# Patient Record
Sex: FEMALE | Race: OTHER | HISPANIC OR LATINO | Employment: FULL TIME | URBAN - METROPOLITAN AREA
[De-identification: names, ages, dates, MRNs, and addresses within clinical notes are randomized per-mention and may not be internally consistent; named-entity substitution may affect disease eponyms.]

---

## 2017-12-20 ENCOUNTER — HOSPITAL ENCOUNTER (EMERGENCY)
Facility: HOSPITAL | Age: 57
Discharge: HOME | End: 2017-12-20
Attending: EMERGENCY MEDICINE | Admitting: EMERGENCY MEDICINE

## 2017-12-20 ENCOUNTER — APPOINTMENT (EMERGENCY)
Dept: CT IMAGING | Facility: HOSPITAL | Age: 57
End: 2017-12-20

## 2017-12-20 VITALS
DIASTOLIC BLOOD PRESSURE: 72 MMHG | SYSTOLIC BLOOD PRESSURE: 119 MMHG | OXYGEN SATURATION: 98 % | WEIGHT: 204.59 LBS | RESPIRATION RATE: 18 BRPM | TEMPERATURE: 97.4 F | HEART RATE: 79 BPM

## 2017-12-20 DIAGNOSIS — S39.012A STRAIN OF LUMBAR REGION, INITIAL ENCOUNTER: Primary | ICD-10-CM

## 2017-12-20 DIAGNOSIS — G43.909 MIGRAINE HEADACHE: ICD-10-CM

## 2017-12-20 LAB
ANION GAP SERPL CALCULATED.3IONS-SCNC: 5 MMOL/L (ref 4–13)
BACTERIA UR QL AUTO: ABNORMAL /HPF
BASOPHILS # BLD AUTO: 0.02 THOUSANDS/ΜL (ref 0–0.1)
BASOPHILS NFR BLD AUTO: 0 % (ref 0–1)
BILIRUB UR QL STRIP: NEGATIVE
BUN SERPL-MCNC: 16 MG/DL (ref 5–25)
CALCIUM SERPL-MCNC: 9.1 MG/DL (ref 8.3–10.1)
CHLORIDE SERPL-SCNC: 107 MMOL/L (ref 100–108)
CLARITY UR: CLEAR
CO2 SERPL-SCNC: 33 MMOL/L (ref 21–32)
COLOR UR: YELLOW
CREAT SERPL-MCNC: 0.8 MG/DL (ref 0.6–1.3)
EOSINOPHIL # BLD AUTO: 0.1 THOUSAND/ΜL (ref 0–0.61)
EOSINOPHIL NFR BLD AUTO: 2 % (ref 0–6)
ERYTHROCYTE [DISTWIDTH] IN BLOOD BY AUTOMATED COUNT: 13.2 % (ref 11.6–15.1)
GFR SERPL CREATININE-BSD FRML MDRD: 82 ML/MIN/1.73SQ M
GLUCOSE SERPL-MCNC: 97 MG/DL (ref 65–140)
GLUCOSE UR STRIP-MCNC: NEGATIVE MG/DL
HCT VFR BLD AUTO: 40.3 % (ref 34.8–46.1)
HGB BLD-MCNC: 12.7 G/DL (ref 11.5–15.4)
HGB UR QL STRIP.AUTO: ABNORMAL
KETONES UR STRIP-MCNC: NEGATIVE MG/DL
LEUKOCYTE ESTERASE UR QL STRIP: ABNORMAL
LYMPHOCYTES # BLD AUTO: 1.48 THOUSANDS/ΜL (ref 0.6–4.47)
LYMPHOCYTES NFR BLD AUTO: 31 % (ref 14–44)
MCH RBC QN AUTO: 30.2 PG (ref 26.8–34.3)
MCHC RBC AUTO-ENTMCNC: 31.5 G/DL (ref 31.4–37.4)
MCV RBC AUTO: 96 FL (ref 82–98)
MONOCYTES # BLD AUTO: 0.31 THOUSAND/ΜL (ref 0.17–1.22)
MONOCYTES NFR BLD AUTO: 7 % (ref 4–12)
MUCOUS THREADS UR QL AUTO: ABNORMAL
NEUTROPHILS # BLD AUTO: 2.8 THOUSANDS/ΜL (ref 1.85–7.62)
NEUTS SEG NFR BLD AUTO: 60 % (ref 43–75)
NITRITE UR QL STRIP: NEGATIVE
NON-SQ EPI CELLS URNS QL MICRO: ABNORMAL /HPF
NRBC BLD AUTO-RTO: 0 /100 WBCS
PH UR STRIP.AUTO: 7 [PH] (ref 4.5–8)
PLATELET # BLD AUTO: 234 THOUSANDS/UL (ref 149–390)
PMV BLD AUTO: 10.3 FL (ref 8.9–12.7)
POTASSIUM SERPL-SCNC: 4.2 MMOL/L (ref 3.5–5.3)
PROT UR STRIP-MCNC: NEGATIVE MG/DL
RBC # BLD AUTO: 4.21 MILLION/UL (ref 3.81–5.12)
RBC #/AREA URNS AUTO: ABNORMAL /HPF
SODIUM SERPL-SCNC: 145 MMOL/L (ref 136–145)
SP GR UR STRIP.AUTO: 1.02 (ref 1–1.03)
UROBILINOGEN UR QL STRIP.AUTO: 0.2 E.U./DL
WBC # BLD AUTO: 4.71 THOUSAND/UL (ref 4.31–10.16)
WBC #/AREA URNS AUTO: ABNORMAL /HPF

## 2017-12-20 PROCEDURE — 85025 COMPLETE CBC W/AUTO DIFF WBC: CPT | Performed by: EMERGENCY MEDICINE

## 2017-12-20 PROCEDURE — 81001 URINALYSIS AUTO W/SCOPE: CPT

## 2017-12-20 PROCEDURE — 36415 COLL VENOUS BLD VENIPUNCTURE: CPT | Performed by: EMERGENCY MEDICINE

## 2017-12-20 PROCEDURE — 81002 URINALYSIS NONAUTO W/O SCOPE: CPT | Performed by: EMERGENCY MEDICINE

## 2017-12-20 PROCEDURE — 96365 THER/PROPH/DIAG IV INF INIT: CPT

## 2017-12-20 PROCEDURE — 96375 TX/PRO/DX INJ NEW DRUG ADDON: CPT

## 2017-12-20 PROCEDURE — 87086 URINE CULTURE/COLONY COUNT: CPT

## 2017-12-20 PROCEDURE — 80048 BASIC METABOLIC PNL TOTAL CA: CPT | Performed by: EMERGENCY MEDICINE

## 2017-12-20 PROCEDURE — 72131 CT LUMBAR SPINE W/O DYE: CPT

## 2017-12-20 PROCEDURE — 99284 EMERGENCY DEPT VISIT MOD MDM: CPT

## 2017-12-20 RX ORDER — MAGNESIUM SULFATE HEPTAHYDRATE 40 MG/ML
2 INJECTION, SOLUTION INTRAVENOUS ONCE
Status: COMPLETED | OUTPATIENT
Start: 2017-12-20 | End: 2017-12-20

## 2017-12-20 RX ORDER — DIPHENHYDRAMINE HYDROCHLORIDE 50 MG/ML
25 INJECTION INTRAMUSCULAR; INTRAVENOUS ONCE
Status: COMPLETED | OUTPATIENT
Start: 2017-12-20 | End: 2017-12-20

## 2017-12-20 RX ORDER — KETOROLAC TROMETHAMINE 30 MG/ML
30 INJECTION, SOLUTION INTRAMUSCULAR; INTRAVENOUS ONCE
Status: COMPLETED | OUTPATIENT
Start: 2017-12-20 | End: 2017-12-20

## 2017-12-20 RX ORDER — METOCLOPRAMIDE HYDROCHLORIDE 5 MG/ML
10 INJECTION INTRAMUSCULAR; INTRAVENOUS ONCE
Status: COMPLETED | OUTPATIENT
Start: 2017-12-20 | End: 2017-12-20

## 2017-12-20 RX ADMIN — KETOROLAC TROMETHAMINE 30 MG: 30 INJECTION, SOLUTION INTRAMUSCULAR at 14:27

## 2017-12-20 RX ADMIN — DIPHENHYDRAMINE HYDROCHLORIDE 25 MG: 50 INJECTION, SOLUTION INTRAMUSCULAR; INTRAVENOUS at 14:23

## 2017-12-20 RX ADMIN — SODIUM CHLORIDE 1000 ML: 0.9 INJECTION, SOLUTION INTRAVENOUS at 14:12

## 2017-12-20 RX ADMIN — METOCLOPRAMIDE 10 MG: 5 INJECTION, SOLUTION INTRAMUSCULAR; INTRAVENOUS at 14:24

## 2017-12-20 RX ADMIN — MAGNESIUM SULFATE HEPTAHYDRATE 2 G: 40 INJECTION, SOLUTION INTRAVENOUS at 14:30
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
## 2017-12-20 NOTE — DISCHARGE INSTRUCTIONS
Distensión lumbar   LO QUE NECESITA SABER:   ¿Qué es la distensión lumbar? La distensión lumbar es edil lesión en los músculos o tendones de la parte inferior de la espalda  Los tendones son los tejidos awa que [place] a los músculos con los [place]  La parte inferior de la espalda sostiene la mayor parte de monterroso peso corporal y facilita monterroso habilidad de moverse, torcerse y doblarse  ¿Qué ocasiona la distensión lumbar? La distensión lumbar por lo general es provocada por actividades que aumentan la tensión en la parte inferior de la espalda eddi el ejercicio o edil lesión  Los siguientes podrían aumentar monterroso riesgo para edil distensión lumbar:  · Usted sufrió anteriormente edil distensión lumbar  · Usted levanta objetos pesados con monterroso espalda en vez de usar afia piernas  · Usted no hace calentamiento antes de hacer ejercicio  · Usted pasa mucho tiempo parado o sentado  · Usted tiene sobrepeso  ¿Cuáles son los signos y síntomas de la distensión lumbar? · Dolor en la parte inferior de la espalda o espasmos musculares           · Rigidez o movimiento limitado    · Dolor que se desplaza hacia los glúteos, sallie o las piernas    · Dolor que empeora con la actividad  ¿Cómo se diagnostica la distensión lumbar? Podría realizarse edil radiografía, edil tomografía computarizada (TC) o edil imagen por resonancia magnética (IRM) para revisar si hay daño en monterroso columna, músculos o tendones  Es posible que le administren líquido de contraste para que la parte inferior de monterroso espalda se chi mejor en las imágenes  Dígale al médico si usted alguna vez ha tenido edil reacción alérgica al líquido de [place]  No entre a la aiyana donde se realiza la resonancia magnética con algo de metal  El metal puede causar lesiones serias  Dígale al médico si usted tiene algo de metal por dentro o sobre monterroso cuerpo  ¿Cómo se trata la distensión lumbar? · El acetaminofén [place] el dolor y baja la fiebre   Está disponible sin receta médica  Pregunte la cantidad y la frecuencia con que debe tomarlos  [address]  El acetaminofén puede causar daño en el hígado cuando no se john de forma correcta  · AINEs (Analgésicos antiinflamatorios no esteroides) eddi el ibuprofeno, ayudan a disminuir la inflamación, el dolor y la [place]  [place] medicamento esta disponible con o sin edil receta médica  Los AINEs pueden causar sangrado estomacal o problemas renales en ciertas personas  Si usted john un medicamento anticoagulante, siempre pregúntele a monterroso médico si los YONATHAN son seguros para usted  Siempre tawnya la etiqueta de meaghan medicamento y [place] instrucciones  · Relajantes musculares  ayudan a reducir dolor y espasmos musculares  · Un medicamento con receta para el dolor  podrían ser Larisa Justa  Pregunte cómo romel estos medicamentos de edil forma gardiner  · Cirugía  Podría necesitarse edil cirugíasi monterroso distensión es severa  ¿Cómo puedo controlar los síntomas? · Descanse  según las indicaciones  Es probable que necesite descansar en cama sun un tiempo después de lesionarse  No levante objetos pesados  · Aplique hielo  en la espalda de 15 a 20 minutos cada hora o eddi se le indique  Use un paquete de hielo o ponga hielo molido dentro de [employer]  Cúbrala con edil toalla  El hielo ayuda a evitar daño al tejido y a disminuir la inflamación y el dolor  · La aplicación de calor  en la parte inferior de la espalda de 20 a 30 minutos cada 2 horas sun los días que le indiquen  El calor ayuda a disminuir el dolor y los espasmos musculares  · Comience lentamente a aumentar monterroso nivel de actividad  conforme disminuya el dolor o eddi se lo indiquen  ¿Cómo se puede evitar la distensión lumbar? · Use movimientos corporales correctos  ¨ Flexione la cadera y las rodillas cuando Sudhir Wilson a levantar un objeto  No doble la cintura  Utilice los [employer] de las piernas mientras levanta monterroso carga  No use monterroso espalda   Nayeli Fong objeto cerca de monterroso pecho mientras lo levanta  No se tuerza, ni levante cualquier cosa por encima de monterroso cintura  ¨ Cambie monterroso posición frecuentemente cuando pase mucho tiempo de pie  Descanse un pie sobre edil Virgle Mule o un reposapiés e intercambie con el otro pie frecuentemente  ¨ No permanezca sentado por lapsos de tiempo prolongados  Cuando sea necesario hacerlo, siéntese en edil silla de respaldo recto con los pies apoyados en el suelo  ¨ Nunca alcance, jale ni empuje mientras se encuentra sentando  · Entre en calor antes de hacer ejercicio  Rita ejercicios que fortalezcan pamela músculos de la espalda  Pregunte a monterroso médico acerca del mejor plan de ejercicio para usted  · Mantenga un peso saludable  Consulte con monterroos médico cuánto debería pesar  Pida que le ayude a crear un plan para bajar de peso si usted tiene sobrepeso  ¿Cuándo michael buscar atención inmediata? · Usted oye o siente un estallido en la parte inferior de la espalda  · Usted tiene mayor inflamación o dolor en la parte inferior de monterroso espalda  · Usted tiene dificultad para  las piernas  · Pamela piernas están entumecidas  ¿Cuándo michael comunicarme con mi médico?   · Usted tiene fiebre  · El dolor no desaparece, incluso después del [place]  · Usted tiene preguntas o inquietudes acerca de monterroso condición o cuidado  ACUERDOS SOBRE MONTERROSO CUIDADO:   Usted tiene el derecho de ayudar a planear monterroso cuidado  Aprenda todo lo que pueda sobre monterroso condición y eddi darle tratamiento  Discuta pamela opciones de tratamiento con pamela médicos para decidir el cuidado que usted desea recibir  Usted siempre tiene el derecho de rechazar el tratamiento  Esta información es sólo para uso en educación  Monterroso intención no es darle un consejo médico sobre enfermedades o tratamientos  Colsulte con monterroso [employer] farmacéutico antes de seguir cualquier régimen médico para saber si es seguro y efectivo para usted    © 2017 26[address] Zack [address]quez Information is for End User's use only and may not be sold, redistributed or otherwise used for commercial purposes  All illustrations and images included in CareNotes® are the copyrighted property of A CU[employer] A M , Inc  or Francois Hensley  Migraña   LO QUE NECESITA SABER:   [place] es un dolor de aubrey intenso  El dolor puede ser tan severo que interfiere con pamela actividades cotidianas  [place] puede durar desde pocas horas hasta varios días  La causa exacta de la migraña no es conocida  INSTRUCCIONES SOBRE EL ANTELMO HOSPITALARIA:   Busque atención médica de inmediato si:   · Usted tiene dolor de aubrey que parece ser diferente o mucho peor que monterroso migraña habitual     · Usted tiene un dolor de aubrey severo con fiebre o rigidez en el aminata  · Usted tiene nuevos problemas con el habla, la visión, el equilibrio o el [address]  · Usted siente que se va a desmayar, se siente confundido o sufre edil convulsión  Comuníquese con monterroso médico o neurólogo si:   · Monterroso migraña interfiere con pamela actividades cotidianas  · Pamela medicamentos o tratamientos loy de funcionar  · Usted tiene preguntas o inquietudes acerca de monterroso condición o cuidado  Medicamentos:  [place] medicamento tan pronto eddi sienta que le comienza [place]  · Un medicamento con receta para el dolor  podrían ser Chanel Sample  No espere a que el dolor sea muy intenso para romel el medicamento  · Medicamentos para la migraña  se [place] para evitar edil migraña o detenerla edil vez que comience  · Los medicamentos contra las náuseas  pueden darse para calmar monterroso estómago y ayudarle a prevenir los vómitos  [place] medicamento también puede aliviar el dolor  · [place] pamela medicamentos eddi se le haya indicado  Llame a monterroso médico si usted piensa que el medicamento no está ayudando o si tiene efectos secundarios  Infórmele si es alérgico a cualquier medicamento   Mantenga edil lista actualizada de los [place], las vitaminas y METHLICK productos herbales que john  Incluya los siguientes datos de los medicamentos: cantidad, frecuencia y motivo de administración  Traiga con usted la lista o los envases de la píldoras a afia citas de seguimiento  Lleve la lista de los medicamentos con usted en barney de edil emergencia  [place] de monterroso síntomas:   · Repose en edil habitación oscura y [place]  [place] ayudará a disminuir el dolor  El dormir también podría ayudarlo a aliviar monterroso dolor  · Aplique hielo para reducir el dolor  Use un paquete de hielo o ponga hielo molido dentro de [employer]  Cubra el paquete con hielo con edil toalla y colóquela en monterroso aubrey donde siente el dolor por 15 a 20 minutos cada hora  · Aplique calor para disminuir el dolor y los espasmos musculares  Utilice edil toalla pequeña empapada con [place], edil almohada térmica o tome un baño de marvin con agua tibia  Aplique la compresa caliente sobre el área por 20 a 30 minutos cada 2 horas  Usted puede alternar el calor y el hielo  · Mantenga un registro de las migrañas  Escriba cuándo comienzan y terminan afia migrañas  Jane Closs y [place] haciendo cuando comenzó [place]  Registre lo que comió y lo que tomó las 24 horas antes de que comenzó monterroso migraña  Mantenga un registro de lo que hizo para tratar monterroso migraña y si funcionó  Programe edil kathi con monterroso médico o monterroso neurólogo eddi se le indique:  Lleve afia registros de migrañas con usted cada vez que visite a monterroso médico  Anote afia preguntas para que se acuerde de hacerlas sun afia visitas  Evite otra migraña:   · No fume  La nicotina y otros químicos en los cigarrillos y cigarros pueden desencadenar edil [place] y [place] provocar daño al pulmón  Pida información a monterroso médico si usted actualmente fuma y necesita ayuda para dejar de fumar  Los cigarrillos electrónicos o tabaco sin humo todavía contienen nicotina  Consulte con monterroso médico antes de [employer]  · No tome alcohol    El alcohol puede provocar migraña  También es posible que interfiera con los medicamentos utilizados para tratar jason migraña  · Ejercítese regularmente  El ejercicio puede ayudar a evitar migrañas  Consulte con jason médico acerca de cuál es el mejor régimen de ejercicio para usted  · Controle el estrés  El estrés podría provocar migraña  Aprenda nuevas maneras de relajarse eddi la respiración profunda  · Establezca un horario para dormir  Acuéstese y levántese a la misma hora cada día  · Coma afia comidas regularmente  Incluya alimentos [employer] fruta, verduras, panes de grano entero, productos lácteos bajos en grasa, frijoles, carne magra y pescado  No consuma alimentos o bebidas que puedan desencadenar afia migrañas  © 2016 [address] is for End User's use only and may not be sold, redistributed or otherwise used for commercial purposes  All illustrations and images included in CareNotes® are the copyrighted property of A D A M , Inc  or Francois Hensley  Esta información es sólo para uso en educación  Jason intención no es darle un consejo médico sobre enfermedades o tratamientos  Colsulte con jason Melven Rimes farmacéutico antes de seguir cualquier régimen médico para saber si es seguro y efectivo para usted

## 2017-12-20 NOTE — ED ATTENDING ATTESTATION
Vel Carpenter MD, saw and evaluated the patient  I have discussed the patient with the resident/non-physician practitioner and agree with the resident's/non-physician practitioner's findings, Plan of Care, and MDM as documented in the resident's/non-physician practitioner's note, except where noted  All available labs and Radiology studies were reviewed  At this point I agree with the current assessment done in the Emergency Department  I have conducted an independent evaluation of this patient a history and physical is as follows:  Patient is a 40-year-old female, history of asthma, comes in complaint of cough, body aches, lumbar back pain chest pain chronic, denies chest pain, dyspnea, fevers or phlegm  On exam no acute distress:  Vital signs stable, airway intact, lung exam shows very faint wheezes, no crackles or rhonchi; cardiovascular normal heart sounds, no peripheral edema, no calf tenderness or swelling, straight leg raise is negative  Impression:  Mild asthma exacerbation acute, acute on chronic back pain  CT Imaging of lumbar spine does not show any acute abnormality, chronic disc disease noted  Will treat as mild asthma exacerbation, acute on chronic back pain, discharged with PCP follow-up      Critical Care Time  CritCare Time    Procedures
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
## 2017-12-21 LAB — BACTERIA UR CULT: NORMAL

## 2018-03-06 ENCOUNTER — APPOINTMENT (EMERGENCY)
Dept: RADIOLOGY | Facility: HOSPITAL | Age: 58
End: 2018-03-06

## 2018-03-06 ENCOUNTER — HOSPITAL ENCOUNTER (EMERGENCY)
Facility: HOSPITAL | Age: 58
Discharge: HOME | End: 2018-03-06
Attending: EMERGENCY MEDICINE | Admitting: EMERGENCY MEDICINE

## 2018-03-06 VITALS
SYSTOLIC BLOOD PRESSURE: 142 MMHG | HEART RATE: 72 BPM | RESPIRATION RATE: 18 BRPM | WEIGHT: 202 LBS | DIASTOLIC BLOOD PRESSURE: 76 MMHG | OXYGEN SATURATION: 99 % | TEMPERATURE: 98 F

## 2018-03-06 DIAGNOSIS — J02.9 ACUTE VIRAL PHARYNGITIS: Primary | ICD-10-CM

## 2018-03-06 DIAGNOSIS — B34.9 ACUTE VIRAL SYNDROME: ICD-10-CM

## 2018-03-06 LAB
ALBUMIN SERPL BCP-MCNC: 3.6 G/DL (ref 3.5–5)
ALP SERPL-CCNC: 90 U/L (ref 46–116)
ALT SERPL W P-5'-P-CCNC: 21 U/L (ref 12–78)
ANION GAP SERPL CALCULATED.3IONS-SCNC: 7 MMOL/L (ref 4–13)
AST SERPL W P-5'-P-CCNC: 18 U/L (ref 5–45)
BACTERIA UR QL AUTO: ABNORMAL /HPF
BASOPHILS # BLD AUTO: 0.01 THOUSANDS/ΜL (ref 0–0.1)
BASOPHILS NFR BLD AUTO: 0 % (ref 0–1)
BILIRUB SERPL-MCNC: 0.41 MG/DL (ref 0.2–1)
BILIRUB UR QL STRIP: NEGATIVE
BUN SERPL-MCNC: 10 MG/DL (ref 5–25)
CALCIUM SERPL-MCNC: 8.6 MG/DL (ref 8.3–10.1)
CHLORIDE SERPL-SCNC: 109 MMOL/L (ref 100–108)
CLARITY UR: CLEAR
CO2 SERPL-SCNC: 30 MMOL/L (ref 21–32)
COLOR UR: YELLOW
CREAT SERPL-MCNC: 0.68 MG/DL (ref 0.6–1.3)
EOSINOPHIL # BLD AUTO: 0.13 THOUSAND/ΜL (ref 0–0.61)
EOSINOPHIL NFR BLD AUTO: 2 % (ref 0–6)
ERYTHROCYTE [DISTWIDTH] IN BLOOD BY AUTOMATED COUNT: 13.4 % (ref 11.6–15.1)
GFR SERPL CREATININE-BSD FRML MDRD: 97 ML/MIN/1.73SQ M
GLUCOSE SERPL-MCNC: 75 MG/DL (ref 65–140)
GLUCOSE UR STRIP-MCNC: NEGATIVE MG/DL
HCT VFR BLD AUTO: 38.6 % (ref 34.8–46.1)
HGB BLD-MCNC: 12.2 G/DL (ref 11.5–15.4)
HGB UR QL STRIP.AUTO: NEGATIVE
KETONES UR STRIP-MCNC: NEGATIVE MG/DL
LEUKOCYTE ESTERASE UR QL STRIP: ABNORMAL
LYMPHOCYTES # BLD AUTO: 1.49 THOUSANDS/ΜL (ref 0.6–4.47)
LYMPHOCYTES NFR BLD AUTO: 27 % (ref 14–44)
MCH RBC QN AUTO: 29.5 PG (ref 26.8–34.3)
MCHC RBC AUTO-ENTMCNC: 31.6 G/DL (ref 31.4–37.4)
MCV RBC AUTO: 93 FL (ref 82–98)
MONOCYTES # BLD AUTO: 0.34 THOUSAND/ΜL (ref 0.17–1.22)
MONOCYTES NFR BLD AUTO: 6 % (ref 4–12)
NEUTROPHILS # BLD AUTO: 3.63 THOUSANDS/ΜL (ref 1.85–7.62)
NEUTS SEG NFR BLD AUTO: 65 % (ref 43–75)
NITRITE UR QL STRIP: NEGATIVE
NON-SQ EPI CELLS URNS QL MICRO: ABNORMAL /HPF
NRBC BLD AUTO-RTO: 0 /100 WBCS
PH UR STRIP.AUTO: 7.5 [PH] (ref 4.5–8)
PLATELET # BLD AUTO: 251 THOUSANDS/UL (ref 149–390)
PMV BLD AUTO: 9.9 FL (ref 8.9–12.7)
POTASSIUM SERPL-SCNC: 4.2 MMOL/L (ref 3.5–5.3)
PROT SERPL-MCNC: 7.3 G/DL (ref 6.4–8.2)
PROT UR STRIP-MCNC: NEGATIVE MG/DL
RBC # BLD AUTO: 4.14 MILLION/UL (ref 3.81–5.12)
RBC #/AREA URNS AUTO: ABNORMAL /HPF
SODIUM SERPL-SCNC: 146 MMOL/L (ref 136–145)
SP GR UR STRIP.AUTO: 1.02 (ref 1–1.03)
TROPONIN I SERPL-MCNC: <0.02 NG/ML
UROBILINOGEN UR QL STRIP.AUTO: 0.2 E.U./DL
WBC # BLD AUTO: 5.6 THOUSAND/UL (ref 4.31–10.16)
WBC #/AREA URNS AUTO: ABNORMAL /HPF

## 2018-03-06 PROCEDURE — 36415 COLL VENOUS BLD VENIPUNCTURE: CPT | Performed by: EMERGENCY MEDICINE

## 2018-03-06 PROCEDURE — 93005 ELECTROCARDIOGRAM TRACING: CPT | Performed by: EMERGENCY MEDICINE

## 2018-03-06 PROCEDURE — 85025 COMPLETE CBC W/AUTO DIFF WBC: CPT | Performed by: EMERGENCY MEDICINE

## 2018-03-06 PROCEDURE — 84484 ASSAY OF TROPONIN QUANT: CPT | Performed by: EMERGENCY MEDICINE

## 2018-03-06 PROCEDURE — 99284 EMERGENCY DEPT VISIT MOD MDM: CPT

## 2018-03-06 PROCEDURE — 81002 URINALYSIS NONAUTO W/O SCOPE: CPT | Performed by: EMERGENCY MEDICINE

## 2018-03-06 PROCEDURE — 71046 X-RAY EXAM CHEST 2 VIEWS: CPT

## 2018-03-06 PROCEDURE — 81001 URINALYSIS AUTO W/SCOPE: CPT

## 2018-03-06 PROCEDURE — 94640 AIRWAY INHALATION TREATMENT: CPT

## 2018-03-06 PROCEDURE — 80053 COMPREHEN METABOLIC PANEL: CPT | Performed by: EMERGENCY MEDICINE

## 2018-03-06 RX ORDER — ALBUTEROL SULFATE 90 UG/1
2 AEROSOL, METERED RESPIRATORY (INHALATION) EVERY 6 HOURS PRN
COMMUNITY
End: 2018-09-21

## 2018-03-06 RX ORDER — ALBUTEROL SULFATE 0.63 MG/3ML
1 SOLUTION RESPIRATORY (INHALATION) EVERY 6 HOURS PRN
COMMUNITY

## 2018-03-06 RX ORDER — IPRATROPIUM BROMIDE AND ALBUTEROL SULFATE 2.5; .5 MG/3ML; MG/3ML
3 SOLUTION RESPIRATORY (INHALATION) ONCE
Status: COMPLETED | OUTPATIENT
Start: 2018-03-06 | End: 2018-03-06

## 2018-03-06 RX ADMIN — IPRATROPIUM BROMIDE AND ALBUTEROL SULFATE 3 ML: .5; 3 SOLUTION RESPIRATORY (INHALATION) at 10:27

## 2018-03-06 NOTE — ED PROVIDER NOTES
History  Chief Complaint   Patient presents with    Fatigue     pt complains of generalized fatigue that began yesterday, complains of nausea, sore, throat, and cough  denies fevers  71-year-old female presents for evaluation of mild sore throat, fatigue, chest pain  The patient describes a burning sensation in her throat without trouble swallowing  The patient has generalized associated fatigue  The patient also describes a substernal chest discomfort  She cannot quantify the duration or intensity of the chest pain  She has not done anything to make it better or any worse  The symptoms have been ongoing since yesterday  History provided by:  Patient      Prior to Admission Medications   Prescriptions Last Dose Informant Patient Reported? Taking? albuterol (ACCUNEB) 0 63 MG/3ML nebulizer solution   Yes Yes   Sig: Take 1 ampule by nebulization every 6 (six) hours as needed for wheezing   albuterol (PROVENTIL HFA,VENTOLIN HFA) 90 mcg/act inhaler   Yes Yes   Sig: Inhale 2 puffs every 6 (six) hours as needed for wheezing      Facility-Administered Medications: None       Past Medical History:   Diagnosis Date    Asthma     Back pain     Migraine        Past Surgical History:   Procedure Laterality Date     SECTION         History reviewed  No pertinent family history  I have reviewed and agree with the history as documented  Social History   Substance Use Topics    Smoking status: Never Smoker    Smokeless tobacco: Never Used    Alcohol use No        Review of Systems   Constitutional: Positive for fatigue  Negative for chills and fever  HENT: Positive for sore throat  Negative for trouble swallowing  Respiratory: Positive for chest tightness  Negative for cough and shortness of breath  Cardiovascular: Positive for chest pain  Gastrointestinal: Negative for abdominal distention, abdominal pain, diarrhea, nausea and vomiting  Neurological: Positive for dizziness  Negative for light-headedness  All other systems reviewed and are negative  Physical Exam  ED Triage Vitals [03/06/18 0947]   Temperature Pulse Respirations Blood Pressure SpO2   98 °F (36 7 °C) 72 18 142/76 99 %      Temp Source Heart Rate Source Patient Position - Orthostatic VS BP Location FiO2 (%)   Temporal Monitor -- Right arm --      Pain Score       4           Orthostatic Vital Signs  Vitals:    03/06/18 0947   BP: 142/76   Pulse: 72       Physical Exam   Constitutional: She is oriented to person, place, and time  She appears well-developed and well-nourished  No distress  HENT:   Head: Normocephalic and atraumatic  Right Ear: External ear normal    Left Ear: External ear normal    Mouth/Throat: Oropharynx is clear and moist and mucous membranes are normal  No oropharyngeal exudate or posterior oropharyngeal edema  Eyes: Conjunctivae and EOM are normal  Pupils are equal, round, and reactive to light  No scleral icterus  Neck: Normal range of motion  Cardiovascular: Normal rate, regular rhythm and normal heart sounds  Pulmonary/Chest: Effort normal  No respiratory distress  She has wheezes ( anterior greater than posterior)  Abdominal: Soft  Bowel sounds are normal  There is no tenderness  There is no rebound and no guarding  Musculoskeletal: Normal range of motion  She exhibits no edema  Neurological: She is alert and oriented to person, place, and time  Skin: Skin is warm and dry  No rash noted  Psychiatric: She has a normal mood and affect  Nursing note and vitals reviewed        ED Medications  Medications   ipratropium-albuterol (DUO-NEB) 0 5-2 5 mg/3 mL inhalation solution 3 mL (3 mL Nebulization Given 3/6/18 1027)       Diagnostic Studies  Results Reviewed     Procedure Component Value Units Date/Time    Urine Microscopic [[id number]]  (Abnormal) Collected:  03/06/18 1131    Lab Status:  Final result Specimen:  Urine from Urine, Clean Catch Updated:  03/06/18 1149     RBC, UA None Seen /hpf      WBC, UA 0-1 (A) /hpf      Epithelial Cells Occasional /hpf      Bacteria, UA Occasional /hpf     POCT urinalysis dipstick [[id number]]  (Abnormal) Resulted:  03/06/18 1130    Lab Status:  Final result Specimen:  Urine Updated:  03/06/18 1131    ED Urine Macroscopic [[id number]]  (Abnormal) Collected:  03/06/18 1131    Lab Status:  Final result Specimen:  Urine Updated:  03/06/18 1130     Color, UA Yellow     Clarity, UA Clear     pH, UA 7 5     Leukocytes, UA Small (A)     Nitrite, UA Negative     Protein, UA Negative mg/dl      Glucose, UA Negative mg/dl      Ketones, UA Negative mg/dl      Urobilinogen, UA 0 2 E U /dl      Bilirubin, UA Negative     Blood, UA Negative     Specific Gravity, UA 1 020    Narrative:       CLINITEK RESULT    Comprehensive metabolic panel [[id number]]  (Abnormal) Collected:  03/06/18 1010    Lab Status:  Final result Specimen:  Blood from Arm, Right Updated:  03/06/18 1045     Sodium 146 (H) mmol/L      Potassium 4 2 mmol/L      Chloride 109 (H) mmol/L      CO2 30 mmol/L      Anion Gap 7 mmol/L      BUN 10 mg/dL      Creatinine 0 68 mg/dL      Glucose 75 mg/dL      Calcium 8 6 mg/dL      AST 18 U/L      ALT 21 U/L      Alkaline Phosphatase 90 U/L      Total Protein 7 3 g/dL      Albumin 3 6 g/dL      Total Bilirubin 0 41 mg/dL      eGFR 97 ml/min/1 73sq m     Narrative:         National Kidney Disease Education Program recommendations are as follows:  GFR calculation is accurate only with a steady state creatinine  Chronic Kidney disease less than 60 ml/min/1 73 sq  meters  Kidney failure less than 15 ml/min/1 73 sq  meters      Troponin I [[id number]]  (Normal) Collected:  03/06/18 1010    Lab Status:  Final result Specimen:  Blood from Arm, Right Updated:  03/06/18 1038     Troponin I <0 02 ng/mL     Narrative:         S[employer]ens Chemistry analyzer 99% cutoff is > 0 04 ng/mL in network labs    o cTnI 99% cutoff is useful only when applied to patients in the clinical setting of myocardial ischemia  o cTnI 99% cutoff should be interpreted in the context of clinical history, ECG findings and possibly cardiac imaging to establish correct diagnosis  o cTnI 99% cutoff may be suggestive but clearly not indicative of a coronary event without the clinical setting of myocardial ischemia  CBC and differential [[id number]]  (Normal) Collected:  03/06/18 1010    Lab Status:  Final result Specimen:  Blood from Arm, Right Updated:  03/06/18 1022     WBC 5 60 Thousand/uL      RBC 4 14 Million/uL      Hemoglobin 12 2 g/dL      Hematocrit 38 6 %      MCV 93 fL      MCH 29 5 pg      MCHC 31 6 g/dL      RDW 13 4 %      MPV 9 9 fL      Platelets 819 Thousands/uL      nRBC 0 /100 WBCs      Neutrophils Relative 65 %      Lymphocytes Relative 27 %      Monocytes Relative 6 %      Eosinophils Relative 2 %      Basophils Relative 0 %      Neutrophils Absolute 3 63 Thousands/µL      Lymphocytes Absolute 1 49 Thousands/µL      Monocytes Absolute 0 34 Thousand/µL      Eosinophils Absolute 0 13 Thousand/µL      Basophils Absolute 0 01 Thousands/µL                  XR chest 2 views   ED Interpretation by Cuong Larsen DO (03/06 1113)   ED Provider X-ray Interpretation      My X-ray interpretation of the Chest: was negative for infiltrate, effusion, pneumothorax, or wide mediastinum      Cuong Larsen DO      Final Result by Katharine Minor MD (03/06 1130)      No acute pulmonary disease              Workstation performed: [id number]                    Procedures  ECG 12 Lead Documentation  Date/Time: 3/6/2018 3:28 PM  Performed by: Rosita Disla  Authorized by: Katharine ARNOLD     Indications / Diagnosis:  Fatigue  ECG reviewed by me, the ED Provider: yes    Patient location:  ED  Previous ECG:     Previous ECG:  Unavailable  Interpretation:     Interpretation: normal    Rate:     ECG rate:  70    ECG rate assessment: normal    Rhythm:     Rhythm: sinus rhythm    Ectopy:     Ectopy: none    QRS:     QRS axis: Normal    QRS intervals:  Normal  Conduction:     Conduction: normal    ST segments:     ST segments:  Normal  T waves:     T waves: normal             Phone Contacts  ED Phone Contact    ED Course  ED Course                                MDM  Number of Diagnoses or Management Options  Acute viral pharyngitis: new and requires workup  Acute viral syndrome: new and requires workup  Diagnosis management comments: Plan is to obtain laboratories, EKG to rule out cardiac etiology versus viral etiology patient's symptoms       Amount and/or Complexity of Data Reviewed  Clinical lab tests: ordered and reviewed  Tests in the radiology section of CPT®: ordered and reviewed  Tests in the medicine section of CPT®: ordered and reviewed  Review and summarize past medical records: yes  Independent visualization of images, tracings, or specimens: yes      CritCare Time    Disposition  Final diagnoses:   Acute viral pharyngitis   Acute viral syndrome     Time reflects when diagnosis was documented in both MDM as applicable and the Disposition within this note     Time User Action Codes Description Comment    3/6/2018 11:27 AM Slim Melo Add [J02 8,  B97 89] Acute viral pharyngitis     3/6/2018 11:27 AM Pavan ARNOLD Add [B34 9] Acute viral syndrome       ED Disposition     ED Disposition Condition Comment    Discharge  Silverio Bundy discharge to home/self care      Condition at discharge: Stable        Follow-up Information     Follow up With Specialties Details Why [address]  In 3 days For further evaluation, if not improved 15[address] [facility] [address]       3947 [facility] Emergency Department Emergency Medicine Go to For further evaluation, If symptoms worsen [address] 2210 [facility] ED, 4[address] [facility] [place], [place], [address]        Discharge Medication List as of 3/6/2018 11:28 AM      CONTINUE these medications which have NOT CHANGED    Details   albuterol (ACCUNEB) 0 63 MG/3ML nebulizer solution Take 1 ampule by nebulization every 6 (six) hours as needed for wheezing, Historical Med      albuterol (PROVENTIL HFA,VENTOLIN HFA) 90 mcg/act inhaler Inhale 2 puffs every 6 (six) hours as needed for wheezing, Historical Med           No discharge procedures on file      ED Provider  Electronically Signed by           Harman Jaime DO  03/06/18 1527

## 2018-03-06 NOTE — DISCHARGE INSTRUCTIONS
Síndrome viral   LO QUE NECESITA SABER:   El síndrome viral es un término general usado para describir edil infección viral que no tiene edil causa definida  Los virus son propagados fácilmente de Beata Perez persona a otra a través del aire y [place] los objetos que se comparten  INSTRUCCIONES SOBRE EL ANTELMO HOSPITALARIA:   Llame al 911 en barney de presentar lo siguiente:   · Usted sufre edil convulsión  · No es posible despertarlo  · Usted tiene dolor torácico y dificultad para respirar  Regrese a la aiyana de emergencias si:   · Usted tiene rigidez en el aminata, dolor de aubrey intenso y sensibilidad a la lorrie  · Usted se siente débil, mareado o confundido  · Usted juany de orinar u orina menos de lo normal      · Usted tose chinmay o edil mucosidad espesa amarilla o venkata  · Usted tiene dolor abdominal severo o monterroso abdomen está más juwan de lo habitual   Pregúntele a monterroso médico qué vitaminas y minerales son adecuados para usted  · Pamela síntomas no mejoran con el tratamiento o empeoran después de 3 días  · Usted tiene salpullido o dolor de oído  · Usted siente ardor al [place]  · Usted tiene preguntas o inquietudes acerca de monterroso condición o cuidado  Medicamentos:  Es posible que usted necesite alguno de los siguientes:  · El acetaminofén  annie el dolor y baja la fiebre  Está disponible sin receta médica  Pregunte cuánto medicamento debe romel y con qué frecuencia  [address]  El acetaminofén puede causar daño en el hígado cuando no se john de forma correcta  · AINEs (Analgésicos antiinflamatorios no esteroides) eddi el ibuprofeno, ayudan a disminuir la inflamación, el dolor y la [place]  Los AINEs pueden causar sangrado estomacal o problemas renales en ciertas personas  Si usted john un medicamento anticoagulante, siempre pregúntele a monterroso médico si los YONATHAN son seguros para usted  Siempre tawnya la etiqueta de shiva medicamento y [place] instrucciones      · El medicamento para el resfriado  ayuda a disminuir la inflamación, a controlar la tos y a aliviar la congestión del pecho o nasal      · El rociador nasal de agua salina  ayuda a disminuir la congestión nasal      · Ivalee afia medicamentos eddi se le haya indicado  Consulte con monterroso médico si usted doyle que monterroso medicamento no le está ayudando o si presenta efectos secundarios  Infórmele si es alérgico a algún medicamento  Mantenga edil lista actualizada de los [place], las vitaminas y los productos herbales que john  Incluya los siguientes datos de los medicamentos: cantidad, frecuencia y motivo de administración  Traiga con usted la lista o los envases de la píldoras a afia citas de seguimiento  Lleve la lista de los medicamentos con usted en barney de edil emergencia  El manejo de monterroso síntomas:   · Consuma líquidos según le indicaron  para evitar la deshidratación  Pregunte cuánto líquido debe romel cada día y cuáles líquidos son los más adecuados para usted  Pregunte si usted debería romel edil solución de rehidratación oral (SRO)  [address] cantidades exactas de agua, sal y azúcar que usted necesita para reemplazar los líquidos corporales  [place] podría ayudarlo a evitar la deshidratación a causa del vómito y de la diarrea  No tome líquidos con cafeína  Los líquidos con cafeína pueden empeorar la deshidratación  · Descanse lo suficiente  para ayudar a que monterroso cuerpo sane  Ivalee siestas sun el día  Pregunte a motnerroso médico cuándo puede regresar a monterroso trabajo y a afia actividades cotidianas  · Use un humidificador de cory frío  para ayudarlo a respirar más fácilmente si usted tiene congestión nasal o del pecho  Pregunte a monterroso médico cómo usar un humidificador de vapor frío  · Coma miel o use caramelos para la tos  para ayudar a disminuir la molestia de la garganta  Pregunte a monterroso médico cuánta miel debería comer cada día  Los caramelos para la tos están disponibles sin receta médica   Siga las indicaciones para romel los caramelos para la tos  · No fume y permanezca lejos de otras personas que fuman  La nicotina y otras sustancias químicas que contienen los cigarrillos y cigarros pueden dañar los pulmones  El fumar también puede retrasar la sanación  Pida información a jason médico si usted actualmente fuma y necesita ayuda para dejar de fumar  Los cigarrillos electrónicos o tabaco sin humo todavía contienen nicotina  Consulte con jason médico antes de [employer]  · Lávese las chuck frecuentemente  para evitar la propagación de gérmenes a otras personas  Utilice agua y Alex  Use gel antibacterial cuando no tenga jabón ni agua disponibles  [employer] las chuck después de usar el baño, toser o estornudar  Lávese las chuck antes de comer o preparar alimentos  Acuda a afia consultas de control con jason médico según le indicaron  Anote afia preguntas para que se acuerde de hacerlas sun afia visitas  © 2017 [address]  Information is for End User's use only and may not be sold, redistributed or otherwise used for commercial purposes  All illustrations and images included in CareNotes® are the copyrighted property of A D A M , Inc  or Francois Hensley  Esta información es sólo para uso en educación  Jason intención no es darle un consejo médico sobre enfermedades o tratamientos  Colsulte con jason [place]ia [place] farmacéutico antes de seguir cualquier régimen médico para saber si es seguro y efectivo para usted  Faringitis   LO QUE NECESITA SABER:   La faringitis o dolor de garganta es la inflamación de los tejidos y estructuras en jason faringe (garganta)  La faringitis es generalmente causada por edil bacteria  También podría ser causada por un resfriado o el virus de la gripe   Otras causas incluyen el fumar, las alergias o el reflujo estomacal    INSTRUCCIONES SOBRE EL ANTELMO HOSPITALARIA:   Llame al 911 en barney de presentar lo siguiente:   · Usted tiene dificultad para respirar o tragar porque jason garganta está inflamada o adolorida  Regrese a la aiyana de emergencias si:   · Usted está babeando porque le duele demasiado tragar  · Usted tiene fiebre por encima de 102? F (39?C) o le dura más de 3 días  · Usted está confundido  · Usted siente sabor a chinmay en monterroso garganta  Pregúntele a monterroso Maxcine Patricia vitaminas y minerales son adecuados para usted  · Monterroso dolor de garganta empeora  · Usted tiene un bulto adolorido en monterroso garganta que no se annie después de 5 días  · Afia síntomas no mejoran después de 5 días  · Usted tiene preguntas o inquietudes acerca de monterroso condición o cuidado  Medicamentos:  La faringitis viral desaparecerá por sí dodie sin necesidad de tratamiento  Monterroso dolor de garganta empezará a mejorar dentro de 3 a 5 días en ambos casos de infecciones virales o bacteriales  Es posible que usted necesite alguno de los siguientes:  · Antibióticos  tratan las infecciones bacteriales  · AINEs (Analgésicos antiinflamatorios no esteroides) eddi el ibuprofeno, ayudan a disminuir la inflamación, el dolor y la [place]  Los AINEs pueden causar sangrado estomacal o problemas renales en ciertas personas  Si usted john un medicamento anticoagulante, siempre pregúntele a monterroso médico si los YONATHAN son seguros para usted  Siempre tawnya la etiqueta de shiva medicamento y [place] instrucciones  · El acetaminofén  [place] el dolor y baja la fiebre  Está disponible sin receta médica  Pregunte la cantidad y la frecuencia con que debe tomarlos  [address]  El acetaminofén puede causar daño en el hígado cuando no se john de forma correcta  · [place] afia medicamentos eddi se le haya indicado  Consulte con monterroso médico si usted doyle que monterroso medicamento no le está ayudando o si presenta efectos secundarios  Infórmele si es alérgico a cualquier medicamento  Mantenga edil lista actualizada de los [place], las vitaminas y los productos herbales que john   Incluya los siguientes datos de los medicamentos: cantidad, frecuencia y motivo de administración  Traiga con usted la lista o los envases de la píldoras a afia citas de seguimiento  Lleve la lista de los medicamentos con usted en barney de edil emergencia  [place] de jason síntomas:   · Rita gárgaras de agua con sal   [place] ¼ de cucharadita de sal en un vaso con 8 onzas de agua tibia y rita gárgaras  [place] podría ayudar a reducir la inflamación en jason garganta  · [address] se le haya indicado  Es posible que usted necesite ingerir mas líquidos de lo habitual  Los líquidos pueden ayudar a aliviar jason garganta y prevenir la deshidratación  Pregunte cuánto líquido debe romel cada día y cuáles líquidos son los más adecuados para usted  · Use un humidificador de vapor frío  para ayudar a humedecer el aire en jason habitación y [place] jason tos  · Alivie jason garganta  con pastillas para la tos, hielo y alimentos blandos o helados de [place]  Prevenga la propagación de la faringitis:  Cúbrase la boca y [place] and [place] cuando tose o estornuda  No comparta alimentos o bebidas  Lávese las chuck frecuentemente  Utilice agua y [place]  Si no tiene agua y jabón disponibles, entonces puede usar un alcohol para chuck en gel  Acuda a afia consultas de control con jason médico según le indicaron  Anote afia preguntas para que se acuerde de hacerlas sun afia visitas  © 2017 [address] Information is for End User's use only and may not be sold, redistributed or otherwise used for commercial purposes  All illustrations and images included in CareNotes® are the copyrighted property of A D A M , Inc  or Francois Hensley  Esta información es sólo para uso en educación  Jason intención no es darle un consejo médico sobre enfermedades o tratamientos  Colsulte con jason [place] farmacéutico antes de seguir cualquier régimen médico para saber si es seguro y efectivo para usted

## 2018-03-07 LAB
ATRIAL RATE: 70 BPM
P AXIS: 15 DEGREES
PR INTERVAL: 166 MS
QRS AXIS: 20 DEGREES
QRSD INTERVAL: 80 MS
QT INTERVAL: 400 MS
QTC INTERVAL: 432 MS
T WAVE AXIS: 33 DEGREES
VENTRICULAR RATE: 70 BPM

## 2018-03-07 PROCEDURE — 93010 ELECTROCARDIOGRAM REPORT: CPT | Performed by: INTERNAL MEDICINE

## 2018-04-09 ENCOUNTER — HOSPITAL ENCOUNTER (EMERGENCY)
Facility: HOSPITAL | Age: 58
Discharge: HOME | End: 2018-04-09
Attending: EMERGENCY MEDICINE | Admitting: EMERGENCY MEDICINE

## 2018-04-09 VITALS
WEIGHT: 198.25 LBS | SYSTOLIC BLOOD PRESSURE: 141 MMHG | OXYGEN SATURATION: 98 % | DIASTOLIC BLOOD PRESSURE: 85 MMHG | TEMPERATURE: 98 F | RESPIRATION RATE: 16 BRPM | HEART RATE: 69 BPM

## 2018-04-09 DIAGNOSIS — S16.1XXA STRAIN OF NECK MUSCLE, INITIAL ENCOUNTER: ICD-10-CM

## 2018-04-09 DIAGNOSIS — S39.012A STRAIN OF LUMBAR REGION, INITIAL ENCOUNTER: Primary | ICD-10-CM

## 2018-04-09 LAB
BACTERIA UR QL AUTO: ABNORMAL /HPF
BILIRUB UR QL STRIP: NEGATIVE
CLARITY UR: CLEAR
COLOR UR: YELLOW
GLUCOSE UR STRIP-MCNC: NEGATIVE MG/DL
HGB UR QL STRIP.AUTO: ABNORMAL
KETONES UR STRIP-MCNC: NEGATIVE MG/DL
LEUKOCYTE ESTERASE UR QL STRIP: ABNORMAL
NITRITE UR QL STRIP: NEGATIVE
NON-SQ EPI CELLS URNS QL MICRO: ABNORMAL /HPF
PH UR STRIP.AUTO: 7 [PH] (ref 4.5–8)
PROT UR STRIP-MCNC: NEGATIVE MG/DL
RBC #/AREA URNS AUTO: ABNORMAL /HPF
SP GR UR STRIP.AUTO: 1.02 (ref 1–1.03)
UROBILINOGEN UR QL STRIP.AUTO: 0.2 E.U./DL
WBC #/AREA URNS AUTO: ABNORMAL /HPF

## 2018-04-09 PROCEDURE — 81001 URINALYSIS AUTO W/SCOPE: CPT

## 2018-04-09 PROCEDURE — 96372 THER/PROPH/DIAG INJ SC/IM: CPT

## 2018-04-09 PROCEDURE — 81002 URINALYSIS NONAUTO W/O SCOPE: CPT | Performed by: PHYSICIAN ASSISTANT

## 2018-04-09 PROCEDURE — 99283 EMERGENCY DEPT VISIT LOW MDM: CPT

## 2018-04-09 RX ORDER — NAPROXEN 500 MG/1
500 TABLET ORAL 2 TIMES DAILY WITH MEALS
Qty: 30 TABLET | Refills: 0 | Status: SHIPPED | OUTPATIENT
Start: 2018-04-09 | End: 2018-05-01

## 2018-04-09 RX ORDER — DIAZEPAM 5 MG/1
5 TABLET ORAL ONCE
Status: COMPLETED | OUTPATIENT
Start: 2018-04-09 | End: 2018-04-09

## 2018-04-09 RX ORDER — DIAZEPAM 5 MG/1
5 TABLET ORAL 2 TIMES DAILY
Qty: 10 TABLET | Refills: 0 | Status: SHIPPED | OUTPATIENT
Start: 2018-04-09 | End: 2018-06-29

## 2018-04-09 RX ORDER — KETOROLAC TROMETHAMINE 30 MG/ML
15 INJECTION, SOLUTION INTRAMUSCULAR; INTRAVENOUS ONCE
Status: COMPLETED | OUTPATIENT
Start: 2018-04-09 | End: 2018-04-09

## 2018-04-09 RX ADMIN — KETOROLAC TROMETHAMINE 15 MG: 30 INJECTION, SOLUTION INTRAMUSCULAR at 11:05

## 2018-04-09 RX ADMIN — DIAZEPAM 5 MG: 5 TABLET ORAL at 11:05

## 2018-04-09 NOTE — ED PROVIDER NOTES
History  Chief Complaint   Patient presents with    Back Pain     Has pain in the lowerback   Shoulder Pain     Has pain in the right shoulder      57y  o female with PMH of asthma, back pain and migraine presents to the ER for low back pain and right sided neck and shoulder pain for 3 days  Symptoms began after she was lifting a heavy box  She denies taking any medication for pain  She describes her pain as throbbing and non-radiating  She rates her pain 8/10 and states it is constant  She denies direct injury  Associated symptoms: nausea  She denies fever, chills, rhinorrhea, congestion, chest pain, dyspnea, vomiting, diarrhea, abdominal pain, urinary symptoms, weakness or paresthesias  History provided by:  Patient   used: No        Prior to Admission Medications   Prescriptions Last Dose Informant Patient Reported? Taking? albuterol (ACCUNEB) 0 63 MG/3ML nebulizer solution   Yes No   Sig: Take 1 ampule by nebulization every 6 (six) hours as needed for wheezing   albuterol (PROVENTIL HFA,VENTOLIN HFA) 90 mcg/act inhaler   Yes No   Sig: Inhale 2 puffs every 6 (six) hours as needed for wheezing      Facility-Administered Medications: None       Past Medical History:   Diagnosis Date    Asthma     Back pain     Migraine        Past Surgical History:   Procedure Laterality Date     SECTION         History reviewed  No pertinent family history  I have reviewed and agree with the history as documented  Social History   Substance Use Topics    Smoking status: Never Smoker    Smokeless tobacco: Never Used    Alcohol use No        Review of Systems   Constitutional: Negative for chills and fever  Eyes: Negative for photophobia and visual disturbance  Respiratory: Negative for shortness of breath  Cardiovascular: Negative for chest pain  Gastrointestinal: Negative for abdominal pain, diarrhea, nausea and vomiting     Genitourinary: Negative for dysuria, frequency, hematuria and urgency  Musculoskeletal: Positive for back pain and neck pain  Negative for neck stiffness  Skin: Negative for rash  Allergic/Immunologic: Negative for food allergies  Neurological: Negative for weakness, numbness and headaches  Physical Exam  ED Triage Vitals [04/09/18 0931]   Temperature Pulse Respirations Blood Pressure SpO2   98 °F (36 7 °C) 69 16 141/85 98 %      Temp Source Heart Rate Source Patient Position - Orthostatic VS BP Location FiO2 (%)   Oral -- Sitting Left arm --      Pain Score       9           Orthostatic Vital Signs  Vitals:    04/09/18 0931   BP: 141/85   Pulse: 69   Patient Position - Orthostatic VS: Sitting       Physical Exam   Constitutional:  Non-toxic appearance  No distress  HENT:   Head: Normocephalic and atraumatic  Right Ear: Tympanic membrane, external ear and ear canal normal  No drainage, swelling or tenderness  No foreign bodies  Tympanic membrane is not erythematous  No hemotympanum  Left Ear: Tympanic membrane, external ear and ear canal normal  No drainage, swelling or tenderness  No foreign bodies  Tympanic membrane is not erythematous  No hemotympanum  Nose: Nose normal    Mouth/Throat: Uvula is midline, oropharynx is clear and moist and mucous membranes are normal  No uvula swelling  No posterior oropharyngeal edema, posterior oropharyngeal erythema or tonsillar abscesses  No tonsillar exudate  Eyes: Conjunctivae and EOM are normal  Pupils are equal, round, and reactive to light  Neck: Normal range of motion and phonation normal  Neck supple  Muscular tenderness present  No spinous process tenderness present  No neck rigidity  No tracheal deviation, no edema, no erythema and normal range of motion present  Cardiovascular: Normal rate, regular rhythm, S1 normal, S2 normal and normal heart sounds  Exam reveals no gallop and no friction rub  No murmur heard    Pulmonary/Chest: Effort normal and breath sounds normal  No respiratory distress  She has no decreased breath sounds  She has no wheezes  She has no rhonchi  She has no rales  She exhibits no tenderness  Abdominal: Soft  Bowel sounds are normal  She exhibits no distension  There is tenderness in the suprapubic area  There is no rebound and no guarding  Musculoskeletal: Normal range of motion  She exhibits no edema or deformity  Cervical back: Normal         Thoracic back: Normal         Lumbar back: She exhibits tenderness and pain  She exhibits normal range of motion, no bony tenderness, no swelling, no edema and no deformity  Back:    Neurological: She is alert  She has normal strength  No cranial nerve deficit or sensory deficit  She exhibits normal muscle tone  Gait normal  GCS eye subscore is 4  GCS verbal subscore is 5  GCS motor subscore is 6  Skin: Skin is warm and dry  No rash noted  Psychiatric: She has a normal mood and affect  Nursing note and vitals reviewed        ED Medications  Medications   ketorolac (TORADOL) injection 15 mg (15 mg Intramuscular Given 4/9/18 1105)   diazepam (VALIUM) tablet 5 mg (5 mg Oral Given 4/9/18 1105)       Diagnostic Studies  Results Reviewed     Procedure Component Value Units Date/Time    Urine Microscopic [[id number]]  (Abnormal) Collected:  04/09/18 1103    Lab Status:  Final result Specimen:  Urine from Urine, Clean Catch Updated:  04/09/18 1129     RBC, UA 1-2 (A) /hpf      WBC, UA 1-2 (A) /hpf      Epithelial Cells Occasional /hpf      Bacteria, UA Occasional /hpf     POCT urinalysis dipstick [[id number]]  (Abnormal) Resulted:  04/09/18 1105    Lab Status:  Final result Specimen:  Urine Updated:  04/09/18 1105    ED Urine Macroscopic [[id number]]  (Abnormal) Collected:  04/09/18 1103    Lab Status:  Final result Specimen:  Urine Updated:  04/09/18 1103     Color, UA Yellow     Clarity, UA Clear     pH, UA 7 0     Leukocytes, UA Trace (A)     Nitrite, UA Negative     Protein, UA Negative mg/dl      Glucose, UA Negative mg/dl      Ketones, UA Negative mg/dl      Urobilinogen, UA 0 2 E U /dl      Bilirubin, UA Negative     Blood, UA Trace (A)     Specific [place], UA 1 020    Narrative:       CLINITEK RESULT                 No orders to display              Procedures  Procedures       Phone Contacts  ED Phone Contact    ED Course  ED Course as of Apr 09 1527 Mon Apr 09, 2018   1123 Awaiting microscopic urine  MDM  Number of Diagnoses or Management Options  Strain of lumbar region, initial encounter: new and requires workup  Strain of neck muscle, initial encounter: new and requires workup  Diagnosis management comments: DDX consists of but not limited to: fracture, contusion, strain, UTI    Will check urine  Will give Toradol and Valium  At discharge, I instructed the patient to:  -follow up with pcp  -take Naproxen as prescribed for mild pain and inflammation  -take Valium as prescribed for muscle strain  -rest and apply heat to the area  -return to the ER if symptoms worsened or new symptoms arose  Patient agreed to this plan and was stable at time of discharge  Amount and/or Complexity of Data Reviewed  Clinical lab tests: ordered and reviewed    Patient Progress  Patient progress: stable    CritCare Time    Disposition  Final diagnoses:   Strain of lumbar region, initial encounter   Strain of neck muscle, initial encounter     Time reflects when diagnosis was documented in both MDM as applicable and the Disposition within this note     Time User Action Codes Description Comment    4/9/2018 11:31 AM Justice TAN Add [[id number]] Strain of lumbar region, initial encounter     4/9/2018 11:32 AM Justice TAN Add [[id number]] Strain of neck muscle, initial encounter       ED Disposition     ED Disposition Condition Comment    Discharge  Sylvia Stain discharge to home/self care      Condition at discharge: Stable        Follow-up Information     Follow up With Specialties Details Why [facility] Medicine Schedule an appointment as soon as possible for a visit in 1 day  [address] [place] [address]  [id number] [phone]        Discharge Medication List as of 4/9/2018 11:33 AM      START taking these medications    Details   diazepam (VALIUM) 5 mg tablet Take 1 tablet (5 mg total) by mouth 2 (two) times a day, Starting Mon 4/9/2018, Print      naproxen (NAPROSYN) 500 mg tablet Take 1 tablet (500 mg total) by mouth 2 (two) times a day with meals, Starting Mon 4/9/2018, Print         CONTINUE these medications which have NOT CHANGED    Details   albuterol (ACCUNEB) 0 63 MG/3ML nebulizer solution Take 1 ampule by nebulization every 6 (six) hours as needed for wheezing, Historical Med      albuterol (PROVENTIL HFA,VENTOLIN HFA) 90 mcg/act inhaler Inhale 2 puffs every 6 (six) hours as needed for wheezing, Historical Med           No discharge procedures on file      ED Provider  Electronically Signed by           Hortencia Norris PA-C  04/09/18 4874

## 2018-04-09 NOTE — DISCHARGE INSTRUCTIONS
Distensión cervical   LO QUE NECESITA SABER:   Edil distensión cervical es un estiramiento o desgarro de un músculo o ligamento del aminata  Los tendones son los tejidos awa que [place] a los músculos con los [place]  Las causas comunes de las distensiones cervicales incluyen un accidente automovilístico, edil caída o edil lesión deportiva  INSTRUCCIONES SOBRE EL ANTELMO HOSPITALARIA:   Regrese a la aiyana de emergencias si:   · Usted tiene dolor o entumecimiento de afia hombros a monterroso mano  · Usted tiene problemas de visión, audición o equilibrio  · Usted se siente confundido o no puede concentrarse  · Tiene problemas con el movimiento y fuerza  Pregúntele a monterroso Annette Dust vitaminas y minerales son adecuados para usted  · Usted tiene más inflamación o dolor en monterroso aminata  · Usted tiene preguntas o inquietudes acerca de monterroso condición o cuidado  Medicamentos:  Es posible que usted necesite alguno de los siguientes:  · Acetaminofeno:  annie el dolor y baja la fiebre  Está disponible sin receta médica  Pregunte la cantidad y la frecuencia con que debe tomarlos  [address]  Adela las etiquetas de todos los demás medicamentos que esté usando para saber si también contienen acetaminofén, o pregunte a monterroso médico o farmacéutico  El acetaminofén puede causar daño en el hígado cuando no se john de forma correcta  No use más de 4 gramos (4000 miligramos) en total de acetaminofeno en un día  · AINEs (Analgésicos antiinflamatorios no esteroides) eddi el ibuprofeno, ayudan a disminuir la inflamación, el dolor y la [place]  [place]iva medicamento esta disponible con o sin edil receta médica  Los AINEs pueden causar sangrado estomacal o problemas renales en ciertas personas  Si usted john un medicamento anticoagulante, siempre pregúntele a monterroso médico si los YONATHAN son seguros para usted  Siempre adela la etiqueta de shiva medicamento y [place] instrucciones      · Relajantes musculares  ayudan a reducir dolor y espasmos musculares  · Un medicamento con receta para el dolor  podrían ser Irl Brakeman  Pregunte al médico cómo debe romel shiva medicamento de forma gardiner  Algunos medicamentos recetados para el dolor contienen acetaminofén  No tome otros medicamentos que contengan acetaminofén sin consultarlo con monterroso médico  Demasiado acetaminofeno puede causar daño al hígado  Los medicamentos recetados para el dolor podrían causar estreñimiento  Pregunte a monterroso médico eddi prevenir o tratar estreñimiento  · [place] afia medicamentos eddi se le haya indicado  Consulte con monterroso médico si usted doyle que monterroso medicamento no le está ayudando o si presenta efectos secundarios  Infórmele si es alérgico a cualquier medicamento  Mantenga edil lista actualizada de los [place], las vitaminas y los productos herbales que john  Incluya los siguientes datos de los medicamentos: cantidad, frecuencia y motivo de administración  Traiga con usted la lista o los envases de la píldoras a afia citas de seguimiento  Lleve la lista de los medicamentos con usted en barney de edil emergencia  El manejo de monterroso síntomas:   · La aplicación de calor  en el aminata sun 15 a 20 minutos, 4 a 6 veces por día o según lo indicado  El calor ayuda a disminuir el dolor, la rigidez y los espasmos musculares  · Comience con ejercicios suaves para monterroso aminata  tan pronto eddi usted pueda  monterroso aminata sin sentir dolor  Los ejercicios le ayudarán a disminuir la rigidez y a mejorar la fuerza y el rango de movimiento de monterroso aminata  Pregunte a monterroso médico qué tipo de ejercicios debe hacer  · Regrese a afia actividades usuales eddi se le indique  Suspéndalas si siente dolor  Evite las [facility] pueden provocar mas daño al aminata, eddi levantar objetos pesados o realizar ejercicio extenuante o de luis manuel intensidad  · Duerma sin almohada  para ayudar a reducir el dolor  En monterroso lugar, enrolle edil toalla pequeña edward apretada y colóquela bajo monterroso aminata       · José Living a terapia física según indicaciones  Un fisioterapeuta le puede enseñar ejercicios para ayudarle a mejorar el movimiento y la fuerza, y para disminuir el dolor  Evite edil lesión en el aminata:   · Conduzca con seguridad  Asegúrese que todos en el paulie usan el cinturón de seguridad  Un cinturón de seguridad puede salvar jason ruth en barney de un accidente automovilístico  No use el celular cuando esté manejando  Axis puede hacer que se distraiga y causar un accidente  Es mejor que pare y se orille si necesita hacer edil Caro Radon un texto  · Use un janette, un chaleco salvavidas y unos implementos de protección  Siempre use un janette al [employer] en bicicleta o motocicleta, esquiar o jugar deportes que podrían causar edil lesión en la aubrey  Use implementos de protección cuando practique deportes  Use un chaleco salvavidas cuando esté en un bote o practicando actividades acuáticas  Acuda a afia consultas de control con jason médico según le indicaron  Puede ser Daisha Callow a un ortopedista o a fisioterapia  Anote afia preguntas para que se acuerde de hacerlas sun afia visitas  © 2017 [facility] [facility] Information is for End User's use only and may not be sold, redistributed or otherwise used for commercial purposes  All illustrations and images included in CareNotes® are the copyrighted property of A D A M , Inc  or Francois Hensley  Esta información es sólo para uso en educación  Jason intención no es darle un consejo médico sobre enfermedades o tratamientos  Colsulte con jason Ozell Fabry farmacéutico antes de seguir cualquier régimen médico para saber si es seguro y efectivo para usted  Distensión lumbar   LO QUE NECESITA SABER:   La distensión lumbar es edil lesión en los músculos o tendones de la parte inferior de la espalda  Los tendones son los tejidos awa que [place] a los músculos con los [place]   La parte inferior de la espalda sostiene la mayor parte de jason peso corporal y facilita jason habilidad de moverse, torcerse y doblarse  INSTRUCCIONES SOBRE EL ANTELMO HOSPITALARIA:   Regrese a la aiyana de emergencias si:   · Usted oye o siente un estallido en la parte inferior de la espalda  · Usted tiene mayor inflamación o dolor en la parte inferior de monterroso espalda  · Usted tiene dificultad para  las piernas  · Afia piernas están entumecidas  Pregúntele a monterroso Berneice Penta vitaminas y minerales son adecuados para usted  · Usted tiene fiebre  · El dolor no desaparece, incluso después del [place]  · Usted tiene preguntas o inquietudes acerca de monterroso condición o cuidado  Medicamentos:  Los siguientes medicamentos pueden  ser recetados por monterroso médico:  · El acetaminofén annie el dolor y baja la fiebre  Está disponible sin receta médica  Pregunte la cantidad y la frecuencia con que debe tomarlos  [address]  El acetaminofén puede causar daño en el hígado cuando no se john de forma correcta  · AINEs (Analgésicos antiinflamatorios no esteroides) eddi el ibuprofeno, ayudan a disminuir la inflamación, el dolor y la [place]  [place] medicamento esta disponible con o sin edil receta médica  Los AINEs pueden causar sangrado estomacal o problemas renales en ciertas personas  Si usted john un medicamento anticoagulante, siempre pregúntele a monterroso médico si los YONATHAN son seguros para usted  Siempre tawnya la etiqueta de [place] medicamento y [place] instrucciones  · Relajantes musculares  ayudan a reducir dolor y espasmos musculares  · Un medicamento con receta para el dolor  podrían ser Fran Clutter  Pregunte cómo romel estos medicamentos de edil forma gardiner  · [place] afia medicamentos eddi se le haya indicado  Consulte con monterroso médico si usted doyle que monterroso medicamento no le está ayudando o si presenta efectos secundarios  Infórmele si es alérgico a cualquier medicamento  Mantenga edil lista actualizada de los [place], las vitaminas y los productos herbales que john   [address] siguientes datos de los medicamentos: cantidad, frecuencia y motivo de administración  Traiga con usted la lista o los envases de la píldoras a afia citas de seguimiento  Lleve la lista de los medicamentos con usted en barney de edil emergencia  Cuidados personales:   · [address] indicaciones  Es probable que necesite descansar en cama sun un tiempo después de lesionarse  No levante objetos pesados  · Aplique hielo  en la espalda de 15 a 20 minutos cada hora o eddi se le indique  Use un paquete de hielo o ponga hielo molido dentro de [employer]  Cúbrala con edil toalla  El hielo ayuda a evitar daño al tejido y a disminuir la inflamación y el dolor  · La aplicación de calor  en la parte inferior de la espalda de 20 a 30 minutos cada 2 horas sun los días que le indiquen  El calor ayuda a disminuir el dolor y los espasmos musculares  · Comience lentamente a aumentar monterroso nivel de actividad  conforme disminuya el dolor o eddi se lo indiquen  Evite otra distensión lumbar:   · Use movimientos corporales correctos  ¨ Flexione la cadera y las rodillas cuando Gypsy Jerica a levantar un objeto  No doble la cintura  Utilice los [employer] de las piernas mientras levanta monterroso carga  No use monterroso espalda  Mantenga el objeto cerca de monterroso pecho mientras lo levanta  No se tuerza, ni levante cualquier cosa por encima de monterroso cintura  ¨ Cambie monterroso posición frecuentemente cuando pase mucho tiempo de pie  Descanse un pie sobre edil Texie Jews o un reposapiés e intercambie con el otro pie frecuentemente  ¨ No permanezca sentado por lapsos de tiempo prolongados  Cuando sea necesario hacerlo, siéntese en edil silla de respaldo recto con los pies apoyados en el suelo  ¨ Nunca alcance, jale ni empuje mientras se encuentra sentando  · Entre en calor antes de hacer ejercicio  Rita ejercicios que fortalezcan afia músculos de la espalda   Pregunte a monterroso médico acerca del mejor plan de ejercicio para usted     · Mantenga un peso saludable  Consulte con jason médico cuánto debería pesar  Pida que le ayude a crear un plan para bajar de peso si usted tiene sobrepeso  © 2017 [address] Zack Vasquez Information is for End User's use only and may not be sold, redistributed or otherwise used for commercial purposes  All illustrations and images included in CareNotes® are the copyrighted property of A D A M , Inc  or Francois Hensley  Esta información es sólo para uso en educación  Jason intención no es darle un consejo médico sobre enfermedades o tratamientos  Colsulte con jason Star Daron farmacéutico antes de seguir cualquier régimen médico para saber si es seguro y efectivo para usted  DISCHARGE INSTRUCTIONS:    FOLLOW UP WITH YOUR PRIMARY CARE PROVIDER OR THE [address] [place] [address]  MAKE AN APPOINTMENT TO BE SEEN  TAKE NAPROXEN AS PRESCRIBED FOR MILD PAIN AND INFLAMMATION  IF RASH, SHORTNESS OF BREATH OR TROUBLE SWALLOWING, STOP TAKING THE MEDICATION AND BE SEEN  TAKE VALIUM AS PRESCRIBED FOR MUSCLE STRAIN  IF RASH, SHORTNESS OF BREATH OR TROUBLE SWALLOWING, STOP TAKING THE MEDICATION AND BE SEEN  NO DRINKING, DRIVING OR OPERATING HEAVY MACHINERY WHILE TAKING THIS MEDICATION  REST AND APPLY HEAT TO THE AREA  IF SYMPTOMS WORSEN OR NEW SYMPTOMS ARISE, RETURN TO THE ER TO BE SEEN

## 2018-04-16 ENCOUNTER — APPOINTMENT (EMERGENCY)
Dept: RADIOLOGY | Facility: HOSPITAL | Age: 58
End: 2018-04-16

## 2018-04-16 ENCOUNTER — HOSPITAL ENCOUNTER (EMERGENCY)
Facility: HOSPITAL | Age: 58
Discharge: HOME | End: 2018-04-16
Attending: EMERGENCY MEDICINE

## 2018-04-16 VITALS
HEART RATE: 58 BPM | TEMPERATURE: 98.1 F | WEIGHT: 182 LBS | OXYGEN SATURATION: 98 % | RESPIRATION RATE: 20 BRPM | DIASTOLIC BLOOD PRESSURE: 96 MMHG | SYSTOLIC BLOOD PRESSURE: 170 MMHG

## 2018-04-16 DIAGNOSIS — M79.604 RIGHT LEG PAIN: ICD-10-CM

## 2018-04-16 DIAGNOSIS — M54.9 CHRONIC BACK PAIN: ICD-10-CM

## 2018-04-16 DIAGNOSIS — M54.9 BACK PAIN: Primary | ICD-10-CM

## 2018-04-16 DIAGNOSIS — G89.29 CHRONIC BACK PAIN: ICD-10-CM

## 2018-04-16 LAB
ANION GAP BLD CALC-SCNC: 13 MMOL/L (ref 4–13)
ATRIAL RATE: 58 BPM
BACTERIA UR QL AUTO: ABNORMAL /HPF
BILIRUB UR QL STRIP: NEGATIVE
BUN BLD-MCNC: 18 MG/DL (ref 5–25)
CA-I BLD-SCNC: 1.14 MMOL/L (ref 1.12–1.32)
CHLORIDE BLD-SCNC: 105 MMOL/L (ref 100–108)
CLARITY UR: ABNORMAL
COLOR UR: YELLOW
CREAT BLD-MCNC: 0.7 MG/DL (ref 0.6–1.3)
GFR SERPL CREATININE-BSD FRML MDRD: 96 ML/MIN/1.73SQ M
GLUCOSE SERPL-MCNC: 118 MG/DL (ref 65–140)
GLUCOSE UR STRIP-MCNC: NEGATIVE MG/DL
HCT VFR BLD CALC: 36 % (ref 34.8–46.1)
HGB BLDA-MCNC: 12.2 G/DL (ref 11.5–15.4)
HGB UR QL STRIP.AUTO: NEGATIVE
KETONES UR STRIP-MCNC: NEGATIVE MG/DL
LEUKOCYTE ESTERASE UR QL STRIP: ABNORMAL
NITRITE UR QL STRIP: NEGATIVE
NON-SQ EPI CELLS URNS QL MICRO: ABNORMAL /HPF
P AXIS: 46 DEGREES
PCO2 BLD: 29 MMOL/L (ref 21–32)
PH UR STRIP.AUTO: 6 [PH] (ref 4.5–8)
POTASSIUM BLD-SCNC: 3.6 MMOL/L (ref 3.5–5.3)
PR INTERVAL: 176 MS
PROT UR STRIP-MCNC: NEGATIVE MG/DL
QRS AXIS: 66 DEGREES
QRSD INTERVAL: 70 MS
QT INTERVAL: 416 MS
QTC INTERVAL: 408 MS
RBC #/AREA URNS AUTO: ABNORMAL /HPF
SODIUM BLD-SCNC: 143 MMOL/L (ref 136–145)
SP GR UR STRIP.AUTO: 1.02 (ref 1–1.03)
SPECIMEN SOURCE: NORMAL
T WAVE AXIS: 37 DEGREES
UROBILINOGEN UR QL STRIP.AUTO: 1 E.U./DL
VENTRICULAR RATE: 58 BPM
WBC #/AREA URNS AUTO: ABNORMAL /HPF

## 2018-04-16 PROCEDURE — 93010 ELECTROCARDIOGRAM REPORT: CPT | Performed by: INTERNAL MEDICINE

## 2018-04-16 PROCEDURE — 73552 X-RAY EXAM OF FEMUR 2/>: CPT

## 2018-04-16 PROCEDURE — 80047 BASIC METABLC PNL IONIZED CA: CPT

## 2018-04-16 PROCEDURE — 87086 URINE CULTURE/COLONY COUNT: CPT

## 2018-04-16 PROCEDURE — 85014 HEMATOCRIT: CPT

## 2018-04-16 PROCEDURE — 96374 THER/PROPH/DIAG INJ IV PUSH: CPT

## 2018-04-16 PROCEDURE — 99284 EMERGENCY DEPT VISIT MOD MDM: CPT

## 2018-04-16 PROCEDURE — 93005 ELECTROCARDIOGRAM TRACING: CPT

## 2018-04-16 PROCEDURE — 87147 CULTURE TYPE IMMUNOLOGIC: CPT

## 2018-04-16 PROCEDURE — 81002 URINALYSIS NONAUTO W/O SCOPE: CPT | Performed by: EMERGENCY MEDICINE

## 2018-04-16 PROCEDURE — 81001 URINALYSIS AUTO W/SCOPE: CPT

## 2018-04-16 RX ORDER — KETOROLAC TROMETHAMINE 30 MG/ML
15 INJECTION, SOLUTION INTRAMUSCULAR; INTRAVENOUS ONCE
Status: COMPLETED | OUTPATIENT
Start: 2018-04-16 | End: 2018-04-16

## 2018-04-16 RX ORDER — KETOROLAC TROMETHAMINE 30 MG/ML
15 INJECTION, SOLUTION INTRAMUSCULAR; INTRAVENOUS ONCE
Status: DISCONTINUED | OUTPATIENT
Start: 2018-04-16 | End: 2018-04-16

## 2018-04-16 RX ORDER — IBUPROFEN 600 MG/1
600 TABLET ORAL EVERY 6 HOURS PRN
Qty: 30 TABLET | Refills: 0 | Status: SHIPPED | OUTPATIENT
Start: 2018-04-16 | End: 2018-05-01

## 2018-04-16 RX ADMIN — KETOROLAC TROMETHAMINE 15 MG: 30 INJECTION, SOLUTION INTRAMUSCULAR at 21:47

## 2018-04-17 NOTE — ED PROVIDER NOTES
History  Chief Complaint   Patient presents with    Back Pain     lower back pain, denies injury  denies loss of bowel and bladder control  reports pain radiates down both legs   Dizziness     patient reports nausea with eating and dizziness only with eating  60-year-old male history of kidney stones in the past presents to the emergency department for evaluation of back pain and dizziness  Patient states that she has been having the symptoms for 2 weeks  Patient states that 2 weeks ago she was working and she lifted a box which caused her to have lower back pain that radiates to her legs bilaterally  Patient states that also 2 weeks ago, a forklift accidentally hit her on her right leg  Patient states she has not taken any medication for pain relief  Patient denies any urinary or bowel incontinence  Patient denies any numbness or weakness of her lower extremities  Otherwise, patient denies any fever chest pain shortness of breath nausea vomiting abdominal pain  Prior to Admission Medications   Prescriptions Last Dose Informant Patient Reported? Taking? albuterol (ACCUNEB) 0 63 MG/3ML nebulizer solution   Yes No   Sig: Take 1 ampule by nebulization every 6 (six) hours as needed for wheezing   albuterol (PROVENTIL HFA,VENTOLIN HFA) 90 mcg/act inhaler   Yes No   Sig: Inhale 2 puffs every 6 (six) hours as needed for wheezing   diazepam (VALIUM) 5 mg tablet   No No   Sig: Take 1 tablet (5 mg total) by mouth 2 (two) times a day   naproxen (NAPROSYN) 500 mg tablet   No No   Sig: Take 1 tablet (500 mg total) by mouth 2 (two) times a day with meals      Facility-Administered Medications: None       Past Medical History:   Diagnosis Date    Asthma     Back pain     Migraine        Past Surgical History:   Procedure Laterality Date     SECTION         History reviewed  No pertinent family history  I have reviewed and agree with the history as documented      Social History Substance Use Topics    Smoking status: Never Smoker    Smokeless tobacco: Never Used    Alcohol use No        Review of Systems   Constitutional: Negative for appetite change, chills, diaphoresis, fatigue and fever  HENT: Negative for congestion, ear discharge, ear pain, hearing loss, postnasal drip, rhinorrhea, sneezing and sore throat  Eyes: Negative for pain, discharge and redness  Respiratory: Negative for choking, chest tightness, shortness of breath, wheezing and stridor  Cardiovascular: Negative for chest pain and palpitations  Gastrointestinal: Negative for abdominal distention, abdominal pain, blood in stool, constipation, diarrhea, nausea and vomiting  Genitourinary: Negative for decreased urine volume, difficulty urinating, dysuria, flank pain, frequency and hematuria  Musculoskeletal: Positive for back pain  Negative for arthralgias, gait problem, joint swelling and neck pain  Skin: Negative for color change, pallor and rash  Allergic/Immunologic: Negative for environmental allergies, food allergies and immunocompromised state  Neurological: Positive for dizziness  Negative for seizures, weakness, light-headedness, numbness and headaches  Hematological: Negative for adenopathy  Does not bruise/bleed easily  Psychiatric/Behavioral: Negative for agitation and behavioral problems         Physical Exam  ED Triage Vitals   Temperature Pulse Respirations Blood Pressure SpO2   04/16/18 2008 04/16/18 2008 04/16/18 2008 04/16/18 2008 04/16/18 2008   98 1 °F (36 7 °C) 71 16 146/84 99 %      Temp Source Heart Rate Source Patient Position - Orthostatic VS BP Location FiO2 (%)   04/16/18 2008 04/16/18 2206 04/16/18 2008 04/16/18 2008 --   Oral Monitor Sitting Right arm       Pain Score       04/16/18 2206       8           Orthostatic Vital Signs  Vitals:    04/16/18 2008 04/16/18 2206 04/16/18 2215   BP: 146/84 170/96 170/96   Pulse: 71 64 58   Patient Position - Orthostatic VS: Sitting Lying        Physical Exam   Constitutional: She is oriented to person, place, and time  She appears well-developed and well-nourished  HENT:   Head: Normocephalic and atraumatic  Nose: Nose normal    Mouth/Throat: Oropharynx is clear and moist    Eyes: Conjunctivae and EOM are normal  Pupils are equal, round, and reactive to light  Neck: Normal range of motion  Neck supple  Cardiovascular: Normal rate, regular rhythm and normal heart sounds  Exam reveals no gallop and no friction rub  No murmur heard  Pulmonary/Chest: Effort normal and breath sounds normal    Abdominal: Soft  Bowel sounds are normal  She exhibits no distension  There is no tenderness  There is no rebound and no guarding  Musculoskeletal: Normal range of motion  She exhibits tenderness  Paraspinal Lumbosacral tenderness  Neurological: She is alert and oriented to person, place, and time  She has normal reflexes  She displays normal reflexes  No cranial nerve deficit or sensory deficit  She exhibits normal muscle tone  Coordination normal    Skin: Skin is warm and dry  No erythema  No pallor  Psychiatric: She has a normal mood and affect  Her behavior is normal    Nursing note and vitals reviewed  ED Medications  Medications   ketorolac (TORADOL) injection 15 mg (15 mg Intravenous Given 4/16/18 2147)       Diagnostic Studies  Results Reviewed     Procedure Component Value Units Date/Time    Urine Microscopic [[id number]]  (Abnormal) Collected:  04/16/18 2223    Lab Status:  Final result Specimen:  Urine from Urine, Clean Catch Updated:  04/16/18 2237     RBC, UA None Seen /hpf      WBC, UA 10-20 (A) /hpf      Epithelial Cells Occasional /hpf      Bacteria, UA Moderate (A) /hpf     Urine culture [[id number]] Collected:  04/16/18 2223    Lab Status:   In process Specimen:  Urine from Urine, Clean Catch Updated:  04/16/18 2237    POCT urinalysis dipstick [[id number]]  (Abnormal) Resulted:  04/16/18 2224    Lab Status:  Final result Updated:  04/16/18 2224    ED Urine Macroscopic [[id number]]  (Abnormal) Collected:  04/16/18 2223    Lab Status:  Final result Specimen:  Urine Updated:  04/16/18 2223     Color, UA Yellow     Clarity, UA Cloudy     pH, UA 6 0     Leukocytes, UA Small (A)     Nitrite, UA Negative     Protein, UA Negative mg/dl      Glucose, UA Negative mg/dl      Ketones, UA Negative mg/dl      Urobilinogen, UA 1 0 E U /dl      Bilirubin, UA Negative     Blood, UA Negative     Specific Gravity, UA 1 025    Narrative:       CLINITEK RESULT    POCT Chem 8+ [[id number]]  (Normal) Collected:  04/16/18 2152    Lab Status:  Final result Updated:  04/16/18 2157     SODIUM, I-STAT 143 mmol/l      Potassium, i-STAT 3 6 mmol/L      Chloride, istat 105 mmol/L      CO2, i-STAT 29 mmol/L      Anion Gap, Istat 13 mmol/L      Calcium, Ionized i-STAT 1 14 mmol/L      BUN, I-STAT 18 mg/dl      Creatinine, i-STAT 0 7 mg/dl      eGFR 96 ml/min/1 73sq m      Glucose, i-STAT 118 mg/dl      Hct, i-STAT 36 %      Hgb, i-STAT 12 2 g/dl      Specimen Type VENOUS                 XR femur 2 views RIGHT   ED Interpretation by Grant Raman MD (04/16 2212)   No acute fracture      Final Result by Kaitlynn Schuster MD (04/16 2226)      No acute right femur fracture  Workstation performed: [id number]               Procedures  Procedures      Phone Consults  ED Phone Contact    ED Course  ED Course                                MDM  Number of Diagnoses or Management Options  Back pain:   Chronic back pain:   Right leg pain:   Diagnosis management comments: 59-year-old female presents emergent department for evaluation of back pain and dizziness  I will order an I-STAT to evaluate for electrolyte abnormalities  Also order right femur x-ray to evaluate for right femur fracture  I will treat patient's pain with Toradol      CritCare Time    Disposition  Final diagnoses:   Back pain   Chronic back pain   Right leg pain     Time reflects when diagnosis was documented in both MDM as applicable and the Disposition within this note     Time User Action Codes Description Comment    4/16/2018 10:43 PM Lang Cea Add [M54 9] Back pain     4/16/2018 10:51 PM Lang Cea Add [M54 9,  G89 29] Chronic back pain     4/16/2018 10:51 PM Lang Cea Add [U64 629] Right leg pain       ED Disposition     ED Disposition Condition Comment    Discharge  Zacarias Hernandez discharge to home/self care  Condition at discharge: Stable        Follow-up Information     Follow up With Specialties Details Why [address] [place] [address]  In 3 days  [address] [address]  [phone]        Patient's Medications   Discharge Prescriptions    IBUPROFEN (MOTRIN) 600 MG TABLET    Take 1 tablet (600 mg total) by mouth every 6 (six) hours as needed for mild pain       Start Date: 4/16/2018 End Date: --       Order Dose: 600 mg       Quantity: 30 tablet    Refills: 0     No discharge procedures on file  ED Provider  Attending physically available and evaluated Zacarias Hernandez I managed the patient along with the ED Attending      Electronically Signed by         Christianne Stapleton MD  04/16/18 8423

## 2018-04-17 NOTE — ED ATTENDING ATTESTATION
Mai Hay MD, saw and evaluated the patient  I have discussed the patient with the resident/non-physician practitioner and agree with the resident's/non-physician practitioner's findings, Plan of Care, and MDM as documented in the resident's/non-physician practitioner's note, except where noted  All available labs and Radiology studies were reviewed  At this point I agree with the current assessment done in the Emergency Department  I have conducted an independent evaluation of this patient a history and physical is as follows:  Patient is the 71-year-old female, complain of lumbar back pain, right thigh pain, dizziness, going off and on for some time, got worse after bent down to lift something; denies fever chest, dyspnea leg swelling, calf pain  On exam, patient is alert, oriented, well-appearing, hemodynamically stable vital signs noted; Lungs clear to auscultation bilaterally; Cardiovascular normal heart sounds:  Normal, equal pulses bilaterally; Abdomen: soft, nontender, nondistended, bowel sounds present; Neuro: normal cranial nerve, motor and sensory exam, no focal deficits; no neck stiffness; Extremities: no calf swelling or tenderness, neurovascular intact distally  Impression:  Due to chronic low pain right thigh pain do x-ray showed fracture, DVT as no calf pain or swelling  X-ray right femur negative  Advised nonsteroidal anti-inflammatory drugs, patient follow-up      Critical Care Time  CritCare Time    Procedures

## 2018-04-17 NOTE — DISCHARGE INSTRUCTIONS
Follow up with primary care doctor in 3-5 days  If your symptoms worsen, return to the ED immedaitely  Dolor de espalda   LO QUE NECESITA SABER:   El dolor de espalda es común  Puede ser causado por edil gran cantidad de afecciones, eddi la artritis o por el deterioro de los discos de la columna vertebral  El riesgo del dolor de espalda aumenta al sufrir lesiones, por falta de [place], o inclinarse hacia adelante o girar de un lado a otro de forma repetitiva  Usted puede estar adolorido o sentir rigidez en gia o ambos lados de la espalda  El dolor se puede propagar a afia glúteos o muslos  INSTRUCCIONES SOBRE EL ANTELMO HOSPITALARIA:   Medicamentos:   · AINEs (Analgésicos antiinflamatorios no esteroides)  ayudan a disminuir la inflamación y el dolor  Shiva medicamento esta disponible con o sin edil receta médica  Los AINEs pueden causar sangrado estomacal o problemas renales en ciertas personas  Si usted john un medicamento anticoagulante, siempre pregúntele a monterroso médico si los YONATHAN son seguros para usted  Siempre tawnya la etiqueta de shiva medicamento y [place] instrucciones  · El acetaminofén  [employer]  Está disponible sin receta médica  Pregunte la cantidad y la frecuencia con que debe tomarlos  [address]  El acetaminofén puede causar daño en el hígado cuando no se john de forma correcta  · Un medicamento con receta para el dolor  podrían ser Larisa Justa  Pregunte al médico cómo debe romel shiva medicamento de forma gardiner  · [place] afia medicamentos eddi se le haya indicado  Consulte con monterroso médico si usted doyle que monterroso medicamento no le está ayudando o si presenta efectos secundarios  Infórmele si es alérgico a cualquier medicamento  Mantenga edil lista actualizada de los [place], las vitaminas y los productos herbales que john  Incluya los siguientes datos de los medicamentos: cantidad, frecuencia y motivo de administración   Hung Arnold con usted la lista o los envases de la píldoras a afia citas de seguimiento  Lleve la lista de los medicamentos con usted en barney de edil emergencia  Acuda en 2 semanas a monterroso kathi de control con monterroso médico, o según le indicaron:  Anote afia preguntas para que se acuerde de hacerlas sun afia visitas  La forma de controlar monterroso dolor de espalda:   · Aplique hielo  en monterroso espalda o en el área afectada de 15 a 20 minutos cada hora o según le indicaron  Use un paquete de hielo o ponga hielo molido dentro de [employer]  Cúbrala con edil toalla  El hielo disminuye el dolor y ayuda a evitar el daño en los tejidos  · La aplicación de calor  en monterroso espalda o en el área afectada de 20 a 30 minutos cada 2 horas sun los días que le indicaron  El calor ayuda a disminuir el dolor y los espasmos musculares  · Manténgase activo  lo más que pueda sin causar [employer]  El reposo en cama puede empeorar monterroso dolor de espalda  Evite levantar objetos hasta que ya no tenga dolor  Regrese a la aiyana de emergencias si:   · Usted tiene dolor, entumecimiento o debilidad en edil o en ambas piernas  · El dolor se vuelve tan intenso que lo incapacita para caminar  · Usted no puede controlar monterroso orina ni afia deposiciones intestinales  · Usted tiene dolor de espalda intenso con dolor en el pecho  · Usted tiene dolor de espalda intenso, náuseas y vómito  · Usted tiene un dolor de espalda intenso que se propaga a un costado o al área genital   Nolvia Eva a monterroso [employer] vitaminas y minerales son adecuados para usted  · Usted tiene dolor de espalda que no mejora con el reposo, ni con el medicamento para el dolor  · Usted tiene fiebre  · Usted tiene un dolor que empeora cuando está acostado boca Samantha Dayday o al descansar  · Usted tiene dolor que empeora cuando tose o estornuda  · Usted pierde peso sin proponérselo  · Usted tiene preguntas o inquietudes acerca de monterroso condición o cuidado    © 2017 [address] Information is for End User's use only and may not be sold, redistributed or otherwise used for commercial purposes  All illustrations and images included in CareNotes® are the copyrighted property of [employer]  or [address]  Esta información es sólo para uso en educación  Monterroso intención no es darle un consejo médico sobre enfermedades o tratamientos  Colsulte con monterroso Zelpha Roch farmacéutico antes de seguir cualquier régimen médico para saber si es seguro y efectivo para usted

## 2018-04-18 LAB
BACTERIA UR CULT: ABNORMAL
BACTERIA UR CULT: ABNORMAL

## 2018-05-01 ENCOUNTER — HOSPITAL ENCOUNTER (EMERGENCY)
Facility: HOSPITAL | Age: 58
Discharge: HOME | End: 2018-05-01
Attending: EMERGENCY MEDICINE | Admitting: EMERGENCY MEDICINE
Payer: OTHER MISCELLANEOUS

## 2018-05-01 ENCOUNTER — APPOINTMENT (EMERGENCY)
Dept: RADIOLOGY | Facility: HOSPITAL | Age: 58
End: 2018-05-01
Payer: OTHER MISCELLANEOUS

## 2018-05-01 VITALS
TEMPERATURE: 98 F | RESPIRATION RATE: 18 BRPM | WEIGHT: 197 LBS | SYSTOLIC BLOOD PRESSURE: 146 MMHG | HEART RATE: 76 BPM | OXYGEN SATURATION: 97 % | DIASTOLIC BLOOD PRESSURE: 80 MMHG

## 2018-05-01 DIAGNOSIS — S39.012A STRAIN OF LUMBAR REGION, INITIAL ENCOUNTER: ICD-10-CM

## 2018-05-01 DIAGNOSIS — S16.1XXA STRAIN OF NECK MUSCLE, INITIAL ENCOUNTER: ICD-10-CM

## 2018-05-01 DIAGNOSIS — M77.8 TENDINITIS OF RIGHT SHOULDER: Primary | ICD-10-CM

## 2018-05-01 PROCEDURE — 73030 X-RAY EXAM OF SHOULDER: CPT

## 2018-05-01 PROCEDURE — 96372 THER/PROPH/DIAG INJ SC/IM: CPT

## 2018-05-01 PROCEDURE — 99283 EMERGENCY DEPT VISIT LOW MDM: CPT

## 2018-05-01 RX ORDER — KETOROLAC TROMETHAMINE 30 MG/ML
30 INJECTION, SOLUTION INTRAMUSCULAR; INTRAVENOUS ONCE
Status: COMPLETED | OUTPATIENT
Start: 2018-05-01 | End: 2018-05-01

## 2018-05-01 RX ORDER — NAPROXEN 500 MG/1
500 TABLET ORAL 2 TIMES DAILY WITH MEALS
Qty: 30 TABLET | Refills: 0 | Status: SHIPPED | OUTPATIENT
Start: 2018-05-01 | End: 2018-06-29

## 2018-05-01 RX ADMIN — KETOROLAC TROMETHAMINE 30 MG: 30 INJECTION, SOLUTION INTRAMUSCULAR at 13:22

## 2018-05-01 NOTE — DISCHARGE INSTRUCTIONS
Tendinitis   LO QUE NECESITA SABER:   La tendinitis es edil inflamación dolorosa o desprendimiento de los tendones  También se le conoce eddi tendinopatía  La tendinitis andie siempre ocurre en la rodilla, hombro, tobillo, cadera o codo  INSTRUCCIONES SOBRE EL ANTELMO HOSPITALARIA:   Medicamentos:   · Los [employer] AINEs y el acetaminofén podrían disminuir la inflamación y el dolor  Estos medicamentos están disponibles sin receta médica  Pregunte qué medicina romel  Pregunte cuánto romel y cuándo  [address]  Los AINES y el acetaminofén podrían causar daños en el hígado o riñón si no se luis correctamente  Si usted john un medicamento anticoagulante, asegúrese de preguntarle a monterroso médico si los YONATHAN son seguros para usted  Adela siempre las instrucciones en la etiqueta del medicamento y [place]di Emily indicaciones antes de usar el [place]  · [place] afia medicamentos eddi se le haya indicado  Consulte con monterroso médico si usted doyle que monterroso medicamento no le está ayudando o si presenta efectos secundarios  Infórmele si es alérgico a cualquier medicamento  Mantenga edil lista actualizada de los [place], las vitaminas y los productos herbales que john  Incluya los siguientes datos de los medicamentos: cantidad, frecuencia y motivo de administración  Traiga con usted la lista o los envases de la píldoras a afia citas de seguimiento  Lleve la lista de los medicamentos con usted en barney de edil emergencia  Control:   · Descanse  el tendón según las indicaciones de monterroso médico para ayudarlo a sanar  Pregúntele a monterroso médico si debe evitar apoyar [employer]  · El hielo  ayuda a disminuir la inflamación y el dolor  El hielo también puede contribuir a evitar el daño de los tejidos  Use un paquete de hielo o ponga hielo molido dentro de [employer]   [place] la bolsa con edil tolla y colóquela sobre el área afectada por 10 a 15 minutos por hora o según indique el médico     · Los dispositivos de apoyo  eddi un bastón, Edmonia Hails, edil plantilla para zapatos o un yeso podrían ayudar a reducir el dolor  · Fisioterapia  es posible que jason médico se lo pida  La terapia física podría usarse para enseñarle a usted ejercicios para ayudarle a mejorar el movimiento y la fuerza, y para disminuir el dolor  Usted también podría aprender a mejorar jason postura y formas de levantarse o ejercitarse correctamente  Prevención:   · Estire o caliente  antes de realizar edil actividad física  · Rita ejercicio regularmente  para fortalecer los músculos que están alrededor de jason articulación  Acostúmbrese a edil rutina de ejercicios por las primeras 3 semanas para evitar otra lesión  Pregunte a jason médico acerca del mejor plan de ejercicio para usted  Descanse por completo después de cada actividad que realice  · Use el equipo correcto  para practicar deportes y hacer ejercicio  Use edil férula o cinta especial para envolver afia articulaciones débiles según indicaciones médicas  Acuda a afia consultas de control con jason médico según le indicaron  Anote afia preguntas para que se acuerde de hacerlas sun afia visitas  Pregúntele a jason Kanika [employer]el vitaminas y minerales son adecuados para usted  · Usted tiene más dolor aun después de tomarse afia medicamentos  · Usted tiene preguntas o inquietudes acerca de jason condición o cuidado  Regrese a la aiyana de emergencias si:   · Usted tiene más enrojecimiento alrededor de la articulación o inflamación en la articulación  · De repente, no puede  jason articulación  © 2017 2600 Zack Vasquez Information is for End User's use only and may not be sold, redistributed or otherwise used for commercial purposes  All illustrations and images included in CareNotes® are the copyrighted property of A D A M , Inc  or Francois Hensley  Esta información es sólo para uso en educación   Jason intención no es darle un consejo [employer] Manjinder enfermedades o tratamientos  Colsulte con monterroso U[place]hur Guess farmacéutico antes de seguir cualquier régimen médico para saber si es seguro y efectivo para usted

## 2018-05-01 NOTE — ED PROVIDER NOTES
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  denies chest pain  70-year-old female presents to the emergency department with complaints of right-sided shoulder pain  States that she has had pain over the past 10 days  Reports that she was a lot of boxes repetitively at work and the pain is worse when doing so  States she has no pain with shoulder the rest has increased pain with trying to move the shoulder  No previous injury to this shoulder  Not currently taking anything at home for her symptoms  History provided by:  Patient and friend   used: No    Shoulder Pain   Location:  Shoulder  Shoulder location:  R shoulder  Injury: no    Pain details:     Quality:  Aching and sharp    Severity:  Moderate    Onset quality:  Gradual    Duration:  10 days    Timing:  Constant    Progression:  Waxing and waning  Handedness:  Right-handed  Prior injury to area:  No  Relieved by:  Being still  Worsened by: Movement  Ineffective treatments:  None tried  Associated symptoms: decreased range of motion    Associated symptoms: no back pain, no fatigue, no fever, no muscle weakness, no neck pain, no numbness, no stiffness, no swelling and no tingling        Prior to Admission Medications   Prescriptions Last Dose Informant Patient Reported? Taking?    albuterol (ACCUNEB) 0 63 MG/3ML nebulizer solution   Yes No   Sig: Take 1 ampule by nebulization every 6 (six) hours as needed for wheezing   albuterol (PROVENTIL HFA,VENTOLIN HFA) 90 mcg/act inhaler   Yes No   Sig: Inhale 2 puffs every 6 (six) hours as needed for wheezing   diazepam (VALIUM) 5 mg tablet   No No   Sig: Take 1 tablet (5 mg total) by mouth 2 (two) times a day   ibuprofen (MOTRIN) 600 mg tablet   No No   Sig: Take 1 tablet (600 mg total) by mouth every 6 (six) hours as needed for mild pain   naproxen (NAPROSYN) 500 mg tablet   No No   Sig: Take 1 tablet (500 mg total) by mouth 2 (two) times a day with meals      Facility-Administered Medications: None       Past Medical History:   Diagnosis Date    Asthma     Back pain     Migraine        Past Surgical History:   Procedure Laterality Date     SECTION         History reviewed  No pertinent family history  I have reviewed and agree with the history as documented  Social History   Substance Use Topics    Smoking status: Never Smoker    Smokeless tobacco: Never Used    Alcohol use No        Review of Systems   Constitutional: Negative for chills, fatigue and fever  HENT: Negative for congestion, dental problem and sore throat  Respiratory: Negative for cough  Cardiovascular: Negative for chest pain  Gastrointestinal: Negative for abdominal pain  Musculoskeletal: Negative for back pain, neck pain and stiffness  Shoulder pain     Skin: Negative for rash and wound  All other systems reviewed and are negative  Physical Exam  ED Triage Vitals [18 1303]   Temperature Pulse Respirations Blood Pressure SpO2   98 °F (36 7 °C) 76 18 146/80 97 %      Temp Source Heart Rate Source Patient Position - Orthostatic VS BP Location FiO2 (%)   Temporal -- -- -- --      Pain Score       Worst Possible Pain           Orthostatic Vital Signs  Vitals:    18 1303   BP: 146/80   Pulse: 76       Physical Exam   Constitutional: She is oriented to person, place, and time  Vital signs are normal  She appears well-developed and well-nourished  HENT:   Head: Normocephalic and atraumatic  Cardiovascular: Normal rate and regular rhythm  Pulmonary/Chest: Effort normal and breath sounds normal  No respiratory distress  She has no wheezes  She has no rhonchi  She has no rales  Musculoskeletal:        Right shoulder: She exhibits decreased range of motion, tenderness and pain   She exhibits no bony tenderness, no swelling, no effusion, no crepitus, no deformity, no laceration, no spasm and normal pulse  Neurological: She is alert and oriented to person, place, and time  Skin: Skin is warm and dry  Psychiatric: She has a normal mood and affect  Her behavior is normal    Nursing note and vitals reviewed  ED Medications  Medications   ketorolac (TORADOL) injection 30 mg (30 mg Intramuscular Given 5/1/18 1322)       Diagnostic Studies  Results Reviewed     None                 XR shoulder 2+ views RIGHT   ED Interpretation by Elizabeth Glass PA-C (05/01 1334)   Normal x-ray of the right shoulder                 Procedures  Procedures       Phone Contacts  ED Phone Contact    ED Course                               MDM  Number of Diagnoses or Management Options  Tendinitis of right shoulder:   Diagnosis management comments: Differential diagnosis includes but not limited to:  Tendinitis, bursitis, arthritis  Amount and/or Complexity of Data Reviewed  Tests in the radiology section of CPT®: ordered and reviewed  Independent visualization of images, tracings, or specimens: yes      CritCare Time    Disposition  Final diagnoses:   Tendinitis of right shoulder     Time reflects when diagnosis was documented in both MDM as applicable and the Disposition within this note     Time User Action Codes Description Comment    5/1/2018  1:35 PM Sharyn Huntley Add [M75 81] Tendinitis of right shoulder     5/1/2018  1:36 PM Sharyn Huntley Add [S39 012A] Strain of lumbar region, initial encounter     5/1/2018  1:36 PM Sharyn Huntley Add [S16  1XXA] Strain of neck muscle, initial encounter       ED Disposition     ED Disposition Condition Comment    Discharge  Wanda Shoemaker discharge to home/self care      Condition at discharge: Stable        Follow-up Information     Follow up With Specialties Details Why [address] [facility] Schedule an appointment as soon as possible for a visit  [facility] [address] [phone]        Patient's Medications   Discharge Prescriptions    No medications on file     No discharge procedures on file      ED Provider  Electronically Signed by           Amado Trinidad PA-C  05/01/18 0958

## 2018-06-07 ENCOUNTER — HOSPITAL ENCOUNTER (EMERGENCY)
Facility: HOSPITAL | Age: 58
Discharge: HOME | End: 2018-06-07
Attending: EMERGENCY MEDICINE | Admitting: EMERGENCY MEDICINE

## 2018-06-07 VITALS
DIASTOLIC BLOOD PRESSURE: 85 MMHG | WEIGHT: 199.2 LBS | RESPIRATION RATE: 18 BRPM | HEART RATE: 68 BPM | SYSTOLIC BLOOD PRESSURE: 149 MMHG | TEMPERATURE: 97.9 F | OXYGEN SATURATION: 99 %

## 2018-06-07 DIAGNOSIS — H92.09 EAR PAIN: Primary | ICD-10-CM

## 2018-06-07 DIAGNOSIS — R09.81 NASAL CONGESTION: ICD-10-CM

## 2018-06-07 DIAGNOSIS — H93.8X3 EAR FULLNESS, BILATERAL: ICD-10-CM

## 2018-06-07 PROCEDURE — 99282 EMERGENCY DEPT VISIT SF MDM: CPT

## 2018-06-07 RX ORDER — GUAIFENESIN 600 MG
600 TABLET, EXTENDED RELEASE 12 HR ORAL EVERY 12 HOURS SCHEDULED
Qty: 28 TABLET | Refills: 0 | Status: SHIPPED | OUTPATIENT
Start: 2018-06-07 | End: 2018-06-21

## 2018-06-07 RX ORDER — FLUTICASONE PROPIONATE 50 MCG
2 SPRAY, SUSPENSION (ML) NASAL DAILY
Qty: 16 G | Refills: 0 | Status: SHIPPED | OUTPATIENT
Start: 2018-06-07 | End: 2018-06-29

## 2018-06-08 NOTE — DISCHARGE INSTRUCTIONS
Dolor de oído   LO QUE NECESITA SABER:   ¿Cuál es la causa del dolor de oído? Un dolor de oído puede ser causado por un problema con monterroso oído  Un problema o edil condición en otra área del cuerpo también puede provocar que el dolor pase a monterroso oído  Un dolor de oído puede deberse a cualquiera de los siguientes:  · Infección del oído interno o externo     · Acumulación de cera, o la introducción de objetos pequeños en el oído     · Lesión del oído a causa de un hisopo de algodón o cambios en la presión de aire al viajar en avión o bucear     · Otras infecciones, eddi amigdalitis o faringitis    · Problemas de la mandíbula o dentales eddi caries o trastorno de la articulación temporomandibular (EVELIN)    · Dolor de aminata causado por problemas eddi la artritis en la parte superior de monterroso columna  ¿Cómo se diagnostica un dolor de oído? Monterroso médico le examinará afia oídos, [place], aminata y boca  También le pedirá que describa afia síntomas  Es posible que reciba cualquiera de los siguientes:  · Rula Brink  es un examen que se [place] para revisar la pérdida de la audición  Monterroso médico va a reproducir sonidos en diferentes volúmenes para revisar que tanto puede escuchar  · La timpanometría  es un examen que se [place] para revisar los cambios en la presión que podrían ser un signo de problemas con el oído interno  ¿Cómo se trata un dolor de oído? · AINEs (Analgésicos antiinflamatorios no esteroides) eddi el ibuprofeno, ayudan a disminuir la inflamación, el dolor y la [place]  Shiva medicamento esta disponible con o sin edil receta médica  Los AINEs pueden causar sangrado estomacal o problemas renales en ciertas personas  Si usted esta tomando un anticoágulante,  siempre  pregunte si los AINEs son seguros para usted  Siempre tawnya la etiqueta de shiva medicamento y [place] instrucciones   No administre shiva medicamento a niños menores de 6 meses de ruth sin antes obtener la autorización de monterroso médico      · El acetaminofén  [place] el dolor y baja la fiebre  Está disponible sin receta médica  Pregunte la cantidad y la frecuencia con que debe tomarlos  [address]  El acetaminofén puede causar daño en el hígado cuando no se john de forma correcta  ¿Cuándo michael buscar atención inmediata? · Usted tiene dolor de oído grave  · Usted tiene [employer] oído con comezón, pérdida de la audición, [place], sensación de llenura o zumbido en afia oídos  ¿Cuándo michael comunicarme con mi médico?   · Trimble dolor de oído empeora o no desaparece con el tratamiento  · Usted tiene secreción en trimble oído  · Usted tiene fiebre  · La parte exterior de trimble oído se pone sanjay, inflamada y tibia al tacto  · Usted tiene preguntas o inquietudes acerca de trimble condición o cuidado  ACUERDOS SOBRE TRIMBLE CUIDADO:   Usted tiene el derecho de ayudar a planear trimble cuidado  Aprenda todo lo que pueda sobre trimble condición y eddi darle tratamiento  Discuta afia opciones de tratamiento con afia médicos para decidir el cuidado que usted desea recibir  Usted siempre tiene el derecho de rechazar el tratamiento  Esta información es sólo para uso en educación  Trimble intención no es darle un consejo médico sobre enfermedades o tratamientos  Colsulte con trimble [place] farmacéutico antes de seguir cualquier régimen médico para saber si es seguro y efectivo para usted  © 2017 [address] Information is for End User's use only and may not be sold, redistributed or otherwise used for commercial purposes  All illustrations and images included in CareNotes® are the copyrighted property of A D A M  Inc  or Francois Hensley  Rinosinus[place]ishan   LO QUE NECESITA SABER:   ¿Qué es la rinosinusitis? La rinosinusitis (RS) es la inflamación de afai fosas nasales y senos paranasales  Por lo general, comienza eddi un virus, frecuentemente eddi un resfriado común  Los virus normalmente toussaint entre 7 y 8 días y no necesitan tratamiento   Cuando el virus no se mejora por sí solo, usted podría tener rinosinusitis bacteriana  [place] significa que ha comenzado a crecer bacteria dentro de afia senos paranasales  La RS aguda dura menos de 4 semanas  La RS crónica dura más de 12 semanas  La RS recurrente es cuando usted tiene 4 o más episodios de rinosinusitis en 1 año  ¿Cuáles son los signos y síntomas de la rinosinusitis? Afia signos y síntomas podrían empeorar cuando usted se [place] sobre jason espalda o cuando trata de dormir  Puede presentar cualquiera de los siguientes signos o síntomas:  · Congestión nasal y pérdida del sentido del olfato    · Flujo nasal espeso con mucosidad amarilla o venkata    · Sensación de presión o dolor en jason [place], o dolor de aubrey    · Dolor en los dientes o mal aliento     · Dolor o presión en el oído     · Fiebre o tos    · [place]  ¿Cómo se diagnostica la rinosinusitis? Jason médico le palpará los senos paranasales y le revisará afia ojos, [place] and [place], boca y oídos  Le preguntará acerca de afia síntomas y por cuánto [employer]un[employer] kelly tenido  Infórmele si afia síntomas bowles luigi o empeorado desde que empezaron  Es posible que [place] de mucosidad de jason nariz muestre qué germen está provocando jason infección  Si usted tiene RS crónica, es posible que necesite exámenes de imágenes  ¿Cómo se trata la rinosinusitis? Afia síntomas usualmente desaparecen por [employer]  Es posible que usted necesite alguno de los siguientes:  · El acetaminofén  annie el dolor y baja la fiebre  Está disponible sin receta médica  Pregunte la cantidad y la frecuencia con que debe tomarlos  [address]  El acetaminofén puede causar daño en el hígado cuando no se john de forma correcta  · AINEs (Analgésicos antiinflamatorios no esteroides) eddi el ibuprofeno, ayudan a disminuir la inflamación, el dolor y la [place]  [place] medicamento esta disponible con o sin edil receta médica  Los AINEs pueden causar sangrado estomacal o problemas renales en ciertas personas   Si usted john un medicamento anticoagulante, siempre pregúntele a monterroso médico si los YONATHAN son seguros para usted  Siempre tawnya la etiqueta de shiva medicamento y [place] instrucciones  · Los aerosoles nasales con esteroides  disminuyen la inflamación de monterroso nariz y de los senos paranasales  · Los descongestionantes  reducen la inflamación y despejan la mucosidad de la nariz y los senos paranasales  Los descongestionantes podrían ayudarle a respirar más fácilmente  · Los antihistamínicos  secar la mucosidad en monterroso nariz y [employer] estornudos  · Antibióticos  sirven para tratar edil infección bacteriana y se podrían necesitar si los síntomas no mejoran o se Lelan Anchors  ¿Cómo puedo controlar los síntomas? · Enjuague afia senos paranasales  Use un aparato para enjuagar afia fosas nasales con edil solución salina (agua salada)  [place] ayudará a diluir la mucosidad en la nariz eliminando el polen y la suciedad  También ayudará a reducir la inflamación para que usted pueda respirar normalmente  Pregunte a monterroso médico con qué frecuencia hacerlo  · Respire vapor  [place] agua en un sartén hasta que salga vapor  Inclínese sobre el cuenco y rita edil carpa con edil toalla juwan  Respire profundamente por aproximadamente 20 minutos  Tenga cuidado de no acercarse demasiado al vapor o de quemarse  Rita esto 3 veces al día  Usted también puede respirar profundamente mientras john edil ducha caliente  · Duerma con la [place]  Coloque edil almohada adicional debajo de monterroso aubrey antes de dormir para ayudar a drenar afia senos paranasales  · [address] se le haya indicado  Pregunte a monterroso médico sobre la cantidad de líquido que necesita romel todos los días y cuáles le recomienda  Los líquidos van a diluir la mucosidad en monterroso [facility] a drenarla  Evite las bebidas que contienen alcohol o cafeína  · No fume y evite el humo de [place]    La nicotina y otros químicos en los cigarrillos y cigarros pueden empeorar pamela síntomas  Pida información a trimble médico si usted actualmente fuma y necesita ayuda para dejar de fumar  Los cigarrillos electrónicos o tabaco sin humo todavía contienen nicotina  Consulte con trimble médico antes de [employer]  ¿Cuándo michael buscar atención inmediata? · Trimble macarena y párpado están enrojecidos, inflamados y adoloridos  · Usted no puede abrir trimble macarena  · Usted tiene visión doble o no ve  · Trimble globo ocular sobresale o usted no puede  trimble macarena  · Usted tiene más sueño de lo normal o nota cambios en trimble habilidad de pensar, moverse o hablar  · Usted tiene el amianta rígido, fiebre o un sonali dolor de aubrey  · Usted tiene inflamada la frente o el cuero cabelludo  ¿Cuándo michael comunicarme con mi médico?   · Pamela síntomas empeoran o no mejoran después de 3 a 5 días de [place]  · Usted tiene preguntas o inquietudes acerca de trimble condición o cuidado  ACUERDOS SOBRE TRIMBLE CUIDADO:   Usted tiene el derecho de ayudar a planear trimble cuidado  Aprenda todo lo que pueda sobre trimble condición y eddi darle tratamiento  Discuta pamela opciones de tratamiento con pamela médicos para decidir el cuidado que usted desea recibir  Usted siempre tiene el derecho de rechazar el tratamiento  Esta información es sólo para uso en educación  Trimble intención no es darle un consejo médico sobre enfermedades o tratamientos  Colsulte con trimble Carollee Lovings farmacéutico antes de seguir cualquier régimen médico para saber si es seguro y efectivo para usted  © 2017 [address]quez Information is for End User's use only and may not be sold, redistributed or otherwise used for commercial purposes  All illustrations and images included in CareNotes® are the copyrighted property of A D A M , Inc  or Francois Hensley

## 2018-06-29 ENCOUNTER — HOSPITAL ENCOUNTER (EMERGENCY)
Facility: HOSPITAL | Age: 58
Discharge: HOME | End: 2018-06-29
Attending: EMERGENCY MEDICINE | Admitting: EMERGENCY MEDICINE

## 2018-06-29 VITALS
WEIGHT: 199.3 LBS | OXYGEN SATURATION: 97 % | TEMPERATURE: 98.1 F | SYSTOLIC BLOOD PRESSURE: 112 MMHG | RESPIRATION RATE: 15 BRPM | DIASTOLIC BLOOD PRESSURE: 74 MMHG | HEART RATE: 72 BPM

## 2018-06-29 DIAGNOSIS — L73.9 FOLLICULITIS: Primary | ICD-10-CM

## 2018-06-29 DIAGNOSIS — S92.501A: ICD-10-CM

## 2018-06-29 PROCEDURE — 99283 EMERGENCY DEPT VISIT LOW MDM: CPT

## 2018-06-29 RX ORDER — CEPHALEXIN 500 MG/1
500 CAPSULE ORAL 4 TIMES DAILY
Qty: 28 CAPSULE | Refills: 0 | Status: SHIPPED | OUTPATIENT
Start: 2018-06-29 | End: 2018-07-06

## 2018-06-29 RX ORDER — MUPIROCIN CALCIUM 20 MG/G
CREAM TOPICAL 3 TIMES DAILY
Qty: 15 G | Refills: 0 | Status: SHIPPED | OUTPATIENT
Start: 2018-06-29 | End: 2018-09-21

## 2018-06-29 NOTE — ED PROVIDER NOTES
History  Chief Complaint   Patient presents with    Cellulitis     Cellulitis  Left lower leg - redness and swelling around a small sore    Foot Pain     Diagnosed 2 days ago with closed fracture 5th metatarsal right foot  History provided by:  Patient   used: No    Medical Problem   Location:  Fractured right 5th toe 2 days ago and now also small red painful bumps to lower extremities for 2 days as well  Severity:  Mild  Onset quality:  Sudden  Duration:  2 days  Timing:  Constant  Progression:  Unchanged  Chronicity:  New  Relieved by:  Nothing  Worsened by: Walking, palpation  Ineffective treatments:  None tried  Associated symptoms: rash    Associated symptoms: no abdominal pain, no fever, no nausea, no shortness of breath and no vomiting     Injured her right 5th toe, closed 5th toe fracture diagnosed 2 days ago  Also has noted now for the last 2 days some erythematous papules with some mild surrounding erythema which are slightly tender  No fevers or chills  No nausea or vomiting  They are slightly tender to the touch  Prior to Admission Medications   Prescriptions Last Dose Informant Patient Reported? Taking? albuterol (ACCUNEB) 0 63 MG/3ML nebulizer solution   Yes Yes   Sig: Take 1 ampule by nebulization every 6 (six) hours as needed for wheezing   albuterol (PROVENTIL HFA,VENTOLIN HFA) 90 mcg/act inhaler   Yes Yes   Sig: Inhale 2 puffs every 6 (six) hours as needed for wheezing      Facility-Administered Medications: None       Past Medical History:   Diagnosis Date    Asthma     Back pain     Migraine        Past Surgical History:   Procedure Laterality Date     SECTION         History reviewed  No pertinent family history  I have reviewed and agree with the history as documented      Social History   Substance Use Topics    Smoking status: Former Smoker    Smokeless tobacco: Never Used    Alcohol use No        Review of Systems   Constitutional: Negative for fever  Respiratory: Negative for shortness of breath  Cardiovascular: Negative for leg swelling  Gastrointestinal: Negative for abdominal pain, nausea and vomiting  Skin: Positive for color change and rash  Neurological: Negative for weakness and numbness  Physical Exam  Physical Exam   Constitutional: She appears well-developed and well-nourished  No distress  HENT:   Head: Normocephalic and atraumatic  Eyes: Conjunctivae are normal  Right eye exhibits no discharge  Left eye exhibits no discharge  Neck: Normal range of motion  Cardiovascular: Normal rate and intact distal pulses  Pulmonary/Chest: Effort normal  No respiratory distress  Musculoskeletal: Normal range of motion  She exhibits no edema  Right foot: There is tenderness and bony tenderness  There is normal range of motion, normal capillary refill, no crepitus and no deformity  Feet:    Neurological: No sensory deficit  She exhibits normal muscle tone  Skin: Skin is warm  Rash noted  She is not diaphoretic  Patient has some scattered papules with some mild surrounding erythema which is slightly tender palpation  No overt cellulitis  The seem to be centered around hair follicles  Also has a small abrasion on the anterior part of the left ankle  Psychiatric: She has a normal mood and affect  Nursing note and vitals reviewed        Vital Signs  ED Triage Vitals [06/29/18 1354]   Temperature Pulse Respirations Blood Pressure SpO2   98 1 °F (36 7 °C) 72 15 112/74 97 %      Temp Source Heart Rate Source Patient Position - Orthostatic VS BP Location FiO2 (%)   Oral -- -- -- --      Pain Score       7           Vitals:    06/29/18 1354   BP: 112/74   Pulse: 72       Visual Acuity      ED Medications  Medications - No data to display    Diagnostic Studies  Results Reviewed     None                 No orders to display              Procedures  Procedures       Phone Contacts  ED Phone Contact    ED Course                               MDM  Number of Diagnoses or Management Options  Closed fracture of fifth toe of right foot: Folliculitis:   Diagnosis management comments: I rachel-taped the patient's 4th and 5th toes on the right for her 5th metatarsal fracture  Possible folliculitis or impetigo placed on antibiotics both pills and cream   Patient is nontoxic in appearance and afebrile and ambulatory  Patient Progress  Patient progress: stable    CritCare Time    Disposition  Final diagnoses: Folliculitis   Closed fracture of fifth toe of right foot - history of     Time reflects when diagnosis was documented in both MDM as applicable and the Disposition within this note     Time User Action Codes Description Comment    6/29/2018  3:10 PM Barrett Rome Add [L39 3] Folliculitis     3/80/4338  3:11 PM Barrett Rome Add [S92 501A] Closed fracture of fifth toe of right foot     6/29/2018  3:11 PM Barrett Rome Modify [C06 495E] Closed fracture of fifth toe of right foot history of      ED Disposition     ED Disposition Condition Comment    Discharge  Lucero Dillard discharge to home/self care      Condition at discharge: Good        Follow-up Information     Follow up With Specialties Details Why [address] [facility] Family Medicine Schedule an appointment as soon as possible for a visit in 3 days  [address] [place] [address] Drive [address] [phone]            Discharge Medication List as of 6/29/2018  3:12 PM      START taking these medications    Details   cephalexin (KEFLEX) 500 mg capsule Take 1 capsule (500 mg total) by mouth 4 (four) times a day for 7 days, Starting Fri 6/29/2018, Until Fri 7/6/2018, Print      mupirocin (BACTROBAN) 2 % cream Apply topically 3 (three) times a day, Starting Fri 6/29/2018, Print         CONTINUE these medications which have NOT CHANGED    Details   albuterol (ACCUNEB) 0 63 MG/3ML nebulizer solution Take 1 ampule by nebulization every 6 (six) hours as needed for wheezing, Historical Med      albuterol (PROVENTIL HFA,VENTOLIN HFA) 90 mcg/act inhaler Inhale 2 puffs every 6 (six) hours as needed for wheezing, Historical Med           No discharge procedures on file      ED Provider  Electronically Signed by           India Oneil MD  06/29/18 2001

## 2018-06-29 NOTE — ED NOTES
Has few spots of what appears like insect bites on her lower legs  States saw a Dr Manjit Patterson for her foot  Is wearing a thin soled thong type of sandal   Right foot is swollen       Kenyatta Taylor RN  06/29/18 9749 [facility] MILES [place]  06/29/18 9766

## 2018-06-29 NOTE — DISCHARGE INSTRUCTIONS
Foliculitis   LO QUE NECESITA SABER:   La foliculitis es edil inflamación de los folículos pilosos  Los folículos pilosos son pequeños sacos que se encuentran debajo de la piel  El pelo crece de estos folículos  La foliculitis es causada por edil bacteria u hongo, reynold más comúnmente por un germen llamado estafilococo  La foliculitis puede ocurrir en cualquier área del cuerpo donde hay pelo o vello  INSTRUCCIONES SOBRE EL ANTELMO HOSPITALARIA:   Medicamentos:   · Antibióticos:  Estos medicamentos pueden ser administrados para ayudarle a combatir las infecciones causadas por bacterias  Se puede administrar en forma de ungüento que se frota sobre la piel o romel en forma de [place]  Siempre tome afia antibióticos exactamente eddi es indicado por monterroso médico  Nunca guarde antibióticos, ni tome antibióticos que fueron recetados para otra enfermedad  · Medicamento antimicótico:  Shiva medicamento ayuda a eliminar el hongo que podría ser la causa de la foliculitis  Se puede administrar en forma de ungüento que se frota sobre la piel o romel en forma de [place]  · AINEs (Analgésicos antiinflamatorios no esteroides) eddi el ibuprofeno, ayudan a disminuir la inflamación, el dolor y la [place]  Shiva medicamento esta disponible con o sin edil receta médica  Los AINEs pueden causar sangrado estomacal o problemas renales en ciertas personas  Si usted esta tomando un anticoágulante,  siempre  pregunte si los AINEs son seguros para usted  Siempre tawnya la etiqueta de shiva medicamento y [place] instrucciones  No administre shiva medicamento a niños menores de 6 meses de ruth sin antes obtener la autorización de monterroso médico      · Antihistamínicos: Estos medicamentos pueden ser administrados para ayudar a disminuir la picazón  · [place] afia medicamentos eddi se le haya indicado  Consulte con monterroso médico si usted doyle que monterroso medicamento no le está ayudando o si presenta efectos secundarios  Infórmele si es alérgico a algún medicamento  Mantenga edil lista actualizada de los [place], las vitaminas y los productos herbales que john  Incluya los siguientes datos de los medicamentos: cantidad, frecuencia y motivo de administración  Traiga con usted la lista o los envases de la píldoras a afia citas de seguimiento  Lleve la lista de los medicamentos con usted en barney de edil emergencia  Programe edil kathi de seguimiento con monterroso médico o especialista, según lo indicado: Anote afia preguntas para que se acuerde de hacerlas sun afia visitas  Controle la foliculitis:   · Use compresas tibias:  Humedezca edil toalla de mano con agua tibia y colóquela sobre el área afectada para reducir el dolor y la inflamación  Las compresas tibias también pueden contribuir a drenar el pus y a que sane más rápido  · Limpie el área:  Lávese el área afectada con un jabón antibacterial  Cambie las toallas de [place] y de baño todos los días  · Evite afeitarse Chey Stall: En la medida de lo posible, no se afeite las áreas donde tenga foliculitis  Si debe afeitarse, use edil rasuradora eléctrica o edil nueva hoja de afeitar cada vez que lo fernando  Prevenga la foliculitis:   · No comparta objetos personales:  No comparta las toallas, jabón ni cualquier otro artículo de uso personal con los demás  · No use ropa apretada:  No use ropa apretada que frote contra monterroso piel y la irrite  · Trate de inmediato las heridas en la piel:  Trate de inmediato las heridas, eddi cortaduras y rasguños  Lávelas con agua tibia y [place], y [place] para prevenir edil infección  Comuníquese con monterroso médico o dermatólogo si:  · Usted tiene fiebre  · Sale pus con mal olor de los bultos de monterroso piel  · El sarpullido se propaga  · Usted tiene preguntas o inquietudes acerca de monterroso condición o cuidado  Regrese a la aiyana de emergencias si:  · Un área extendida de piel alrededor de la foliculitis se pone de color au, se siente caliente y le duele  · Le salen forúnculos    © 2017 [employer] [facility] is for End User's use only and may not be sold, redistributed or otherwise used for commercial purposes  All illustrations and images included in CareNotes® are the copyrighted property of A D A M , Inc  or Francois Hensley  Esta información es sólo para uso en educación  Monterroso intención no es darle un consejo médico sobre enfermedades o tratamientos  Colsulte con monterroso Cherylyn Plough farmacéutico antes de seguir cualquier régimen médico para saber si es seguro y efectivo para usted  Fractura de pie en adultos   LO QUE NECESITA SABER:   Se produce edil fractura en el pie cuando gia o más de los huesos del pie se [place]  Las fracturas de pie por lo general son causadas por traumas, caídas, lesiones por fatiga de edil acción repetitiva  INSTRUCCIONES SOBRE EL ANTELMO HOSPITALARIA:   Medicamentos:   · Antibióticos:  Shiva medicamento se administra para ayudar a tratar o prevenir edil infección causada por bacteria  · AINEs (analgésicos antiinflamatorios no esteroides):  Estos medicamentos disminuyen la inflamación y el dolor  Los AINEs se pueden obtener sin receta médica  Pregunte cuál de estos medicamentos es apropiado para usted  Pregunte cuánto romel y cuándo  Tómelos eddi se le indique  Cuando no se luis de la [employer], los medicamentos antiinflamatorios no esteroides pueden causar sangrado estomacal y problemas renales  · Analgésicos:  Es posible que le receten un medicamento para aliviar el dolor  No espere hasta que el dolor sea severo antes de romel shiva medicamento  · [place] afia medicamentos eddi se le haya indicado  Consulte con monterroso médico si usted doyle que monterroso medicamento no le está ayudando o si presenta efectos secundarios  Infórmele si es alérgico a cualquier medicamento  Mantenga edil lista actualizada de los [place], las vitaminas y los productos herbales que john   Incluya los siguientes datos de los medicamentos: cantidad, frecuencia y motivo de administración  Traiga con usted la lista o los envases de la píldoras a afia citas de seguimiento  Lleve la lista de los medicamentos con usted en barney de viola emergencia  Acuda a afia consultas de control con monterroso médico o especialista en huesos según le indicaron:  Es posible que deba regresar para que le quiten el yeso, la [place], los dispositivos de fijación externa o los puntos de sutura  También es posible que deba regresar para hacerse alguna prueba para verificar que monterroso pie esté sanando  Anote afia preguntas para que se acuerde de hacerlas sun afia visitas  Cuidado de los clavos: Si le pusieron clavos en el pie, tendrá que limpiarlos todos los días  De shiva modo ayudará a evitar viola infección  Pida más información acerca de cómo limpiar los clavos  Cuidado de la herida:  Lave cuidadosamente la herida con agua y Alex  Seque el área y póngale vendajes nuevos y limpios eddi le indicaron  Cambie afia vendajes cuando se mojen o ensucien  Cuidados personales:   · Descanse:  Es posible que deba descansar el pie y evitar las actividades que le provocan dolor  [address] por estrés, usted tendrá que evitar la actividad que causó la fractura hasta que logre sanar  Consulte cuándo puede regresar a afia [employer], eddi por ejemplo el [place] y los deportes  · Hielo:  El hielo ayuda a disminuir la inflamación y el dolor  El hielo también puede contribuir a evitar el daño de los tejidos  Use viola bolsa con hielo o ponga hielo triturado en viola bolsa de plástico  Leslie Salen el hielo con [place]  y colóquelo sobre monterroso pie sun 15 a 20 minutos cada hora o eddi le indiquen  · Eleve el pie:  Levante el pie por encima del nivel de monterroso corazón tan a menudo eddi pueda  [place] va a disminuir inflamación y el dolor  [place] monterroso pie sobre almohadas o mantas para mantenerlo elevado cómodamente       · Fisioterapia:  Viola vez que haya sanado, un fisioterapeuta podrá enseñarle ejercicios para fortalecer el pie, aumentar el movimiento y aliviar el dolor  Cuidado del yeso o la férula:   · Fíjese todos los días si la piel alrededor del yeso o la férula está enrojecida o [place]  · No use objetos afilados o punzantes para rascarse la piel debajo del yeso o la férula  · No se quite la férula a menos que monterroso médico o cirujano ortopédico lo autorice  Bañarse con un yeso o edil férula:  No permita que el yeso o férula se moje  Antes de bañarse, cubra el yeso o la férula con edil bolsa plástica  Pegue la bolsa a monterroso piel encima de la férula o yeso para sellar el agua  Mantenga el pie fuera del agua por si el agua se filtra dentro de la [place]  Pregunte cuándo puede romel un baño o [place]  Dispositivos de asistencia:  Es posible que le den un zapato de suela dura para que use mientras monterroso pie se cleo  También es posible que deba usar muletas eddi ayuda para caminar mientras monterroso pie se [place]  Es muy importante usar [employer]  Pida más información sobre cómo usar las [place]  Comuníquese con monterroso médico o monterroso especialista en huesos si:   · Usted tiene fiebre  · Le salen nuevas llagas alrededor de la bota, el yeso o la [place]  · Le surge dificultad o tiene mayor dificultad para  el pie  · Nota que sale mal olor de debajo del yeso  · Se le daña la bota, el yeso o la férula  · Usted tiene preguntas o inquietudes acerca de monterroso condición o cuidado  Busque atención médica de inmediato o llame al 911 si:   · El dolor en monterroso pie lesionado empeora, aún después de descansar y romel el analgésico para el dolor    · La piel o los dedos del pie se le adormecen, están inflamados, fríos, blancos o azules  · Usted tiene más dolor o inflamación que antes de que le pusieran el yeso  · La herida drena líquido o pus  · La chinmay empapa el vendaje  · Monterroso pierna se siente cálida, sensible y [place]  Se podría jean inflamado y au  · De repente se siente mareado y sin aire      · [place] Stank duele el pecho cuando respira hondo o tose  Es posible que [employer]  © 2017 [address] Information is for End User's use only and may not be sold, redistributed or otherwise used for commercial purposes  All illustrations and images included in CareNotes® are the copyrighted property of A D A M , Inc  or Francois Hensley  Esta información es sólo para uso en educación  Jason intención no es darle un consejo médico sobre enfermedades o tratamientos  Colsulte con jason Ladean Artist farmacéutico antes de seguir cualquier régimen médico para saber si es seguro y efectivo para usted

## 2018-09-06 NOTE — ED PROVIDER NOTES
History  Chief Complaint   Patient presents with    Earache     Pt reports B/L ear pain x 3 days, also reports bloody drainage when cleaning with Q-tip, denies fevers, denies injury     63 yo female with a history of asthma, presents for evaluation of bilateral ear pain x 3 days  Pt also states she has been having nasal congestion and pressure which she states she feels in her ears  Reports sinus pressure  States that she noted bloody discharge from her ears when using a Q-tip  She states she has not been taking anything for the pain or congestion  She denies other sick contacts  Denies fever, chills, drainage from ear, pool/swimming, abdominal pain, sob, cp, cough  Prior to Admission Medications   Prescriptions Last Dose Informant Patient Reported? Taking? albuterol (ACCUNEB) 0 63 MG/3ML nebulizer solution   Yes No   Sig: Take 1 ampule by nebulization every 6 (six) hours as needed for wheezing   albuterol (PROVENTIL HFA,VENTOLIN HFA) 90 mcg/act inhaler   Yes No   Sig: Inhale 2 puffs every 6 (six) hours as needed for wheezing   diazepam (VALIUM) 5 mg tablet   No No   Sig: Take 1 tablet (5 mg total) by mouth 2 (two) times a day   naproxen (NAPROSYN) 500 mg tablet   No No   Sig: Take 1 tablet (500 mg total) by mouth 2 (two) times a day with meals      Facility-Administered Medications: None       Past Medical History:   Diagnosis Date    Asthma     Back pain     Migraine        Past Surgical History:   Procedure Laterality Date     SECTION         History reviewed  No pertinent family history  I have reviewed and agree with the history as documented  Social History   Substance Use Topics    Smoking status: Former Smoker    Smokeless tobacco: Never Used    Alcohol use No        Review of Systems   Constitutional: Negative for chills and fever  HENT: Positive for congestion, ear pain and sinus pressure  Negative for ear discharge, sore throat, trouble swallowing and voice change  Cardiovascular: Negative for chest pain  Gastrointestinal: Negative for nausea and vomiting  Musculoskeletal: Negative for myalgias  Skin: Negative  Physical Exam  Physical Exam   Constitutional: She appears well-developed and well-nourished  No distress  HENT:   Head: Normocephalic and atraumatic  Right Ear: Hearing, tympanic membrane, external ear and ear canal normal  No drainage or swelling  Left Ear: Hearing, tympanic membrane, external ear and ear canal normal  No drainage or swelling  Mouth/Throat: Oropharynx is clear and moist    Eyes: Conjunctivae are normal  Right eye exhibits no discharge  Left eye exhibits no discharge  Neck: Normal range of motion  Neck supple  Cardiovascular: Normal rate and normal heart sounds  Pulmonary/Chest: Effort normal and breath sounds normal    Skin: Skin is warm  She is not diaphoretic  Vitals reviewed        Vital Signs  ED Triage Vitals [06/07/18 2042]   Temperature Pulse Respirations Blood Pressure SpO2   97 9 °F (36 6 °C) 68 18 149/85 99 %      Temp Source Heart Rate Source Patient Position - Orthostatic VS BP Location FiO2 (%)   Oral Monitor Sitting Right arm --      Pain Score       7           Vitals:    06/07/18 2042   BP: 149/85   Pulse: 68   Patient Position - Orthostatic VS: Sitting       Visual Acuity      ED Medications  Medications - No data to display    Diagnostic Studies  Results Reviewed     None                 No orders to display              Procedures  Procedures       Phone Contacts  ED Phone Contact    ED Course                               MDM  CritCare Time    Disposition  Final diagnoses:   Ear pain   Ear fullness, bilateral   Nasal congestion     Time reflects when diagnosis was documented in both MDM as applicable and the Disposition within this note     Time User Action Codes Description Comment    6/7/2018  9:57 PM Ashtyn Ibanez Add [H92 09] Ear pain     6/7/2018  9:57 PM Ashtyn Ibanez Add [E26 4M6] Ear fullness, bilateral     6/7/2018  9:59 PM Liz Adam Add [R09 81] Nasal congestion       ED Disposition     ED Disposition Condition Comment    Discharge  Rick Beltran discharge to home/self care  Condition at discharge: Good        Follow-up Information     Follow up With Specialties Details Why [facility] Schedule an appointment as soon as possible for a visit in 1 week Follow up with a family care provider for re-check of symptoms if they persist   [address] [place] [address]  [id number] [phone]          Discharge Medication List as of 6/7/2018 10:00 PM      START taking these medications    Details   fluticasone (FLONASE) 50 mcg/act nasal spray 2 sprays into each nostril daily, Starting Thu 6/7/2018, Print      guaiFENesin (MUCINEX) 600 mg 12 hr tablet Take 1 tablet (600 mg total) by mouth every 12 (twelve) hours for 14 days, Starting Thu 6/7/2018, Until Thu 6/21/2018, Print         CONTINUE these medications which have NOT CHANGED    Details   albuterol (ACCUNEB) 0 63 MG/3ML nebulizer solution Take 1 ampule by nebulization every 6 (six) hours as needed for wheezing, Historical Med      albuterol (PROVENTIL HFA,VENTOLIN HFA) 90 mcg/act inhaler Inhale 2 puffs every 6 (six) hours as needed for wheezing, Historical Med      diazepam (VALIUM) 5 mg tablet Take 1 tablet (5 mg total) by mouth 2 (two) times a day, Starting Mon 4/9/2018, Print      naproxen (NAPROSYN) 500 mg tablet Take 1 tablet (500 mg total) by mouth 2 (two) times a day with meals, Starting Tue 5/1/2018, Print           No discharge procedures on file      ED Provider  Electronically Signed by           Marry Tamayo PA-C  06/24/18 5576 Wound Care: Petrolatum

## 2018-09-13 ENCOUNTER — APPOINTMENT (EMERGENCY)
Dept: RADIOLOGY | Facility: HOSPITAL | Age: 58
End: 2018-09-13
Payer: COMMERCIAL

## 2018-09-13 ENCOUNTER — HOSPITAL ENCOUNTER (EMERGENCY)
Facility: HOSPITAL | Age: 58
Discharge: HOME | End: 2018-09-13
Attending: EMERGENCY MEDICINE | Admitting: EMERGENCY MEDICINE
Payer: COMMERCIAL

## 2018-09-13 VITALS
SYSTOLIC BLOOD PRESSURE: 168 MMHG | RESPIRATION RATE: 18 BRPM | TEMPERATURE: 97.1 F | HEART RATE: 69 BPM | DIASTOLIC BLOOD PRESSURE: 82 MMHG | OXYGEN SATURATION: 97 %

## 2018-09-13 DIAGNOSIS — M25.511 SHOULDER PAIN, RIGHT: Primary | ICD-10-CM

## 2018-09-13 DIAGNOSIS — S16.1XXA STRAIN OF NECK MUSCLE, INITIAL ENCOUNTER: ICD-10-CM

## 2018-09-13 PROCEDURE — 99283 EMERGENCY DEPT VISIT LOW MDM: CPT

## 2018-09-13 PROCEDURE — 73030 X-RAY EXAM OF SHOULDER: CPT

## 2018-09-13 RX ORDER — NAPROXEN 375 MG/1
375 TABLET ORAL 2 TIMES DAILY WITH MEALS
Qty: 20 TABLET | Refills: 0 | Status: SHIPPED | OUTPATIENT
Start: 2018-09-13 | End: 2018-09-21

## 2018-09-13 RX ORDER — BACLOFEN 10 MG/1
10 TABLET ORAL 3 TIMES DAILY
Qty: 15 TABLET | Refills: 0 | Status: SHIPPED | OUTPATIENT
Start: 2018-09-13 | End: 2018-09-21

## 2018-09-13 NOTE — ED PROVIDER NOTES
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 Taking? albuterol (ACCUNEB) 0 63 MG/3ML nebulizer solution   Yes No   Sig: Take 1 ampule by nebulization every 6 (six) hours as needed for wheezing   albuterol (PROVENTIL HFA,VENTOLIN HFA) 90 mcg/act inhaler   Yes No   Sig: Inhale 2 puffs every 6 (six) hours as needed for wheezing   mupirocin (BACTROBAN) 2 % cream   No No   Sig: Apply topically 3 (three) times a day      Facility-Administered Medications: None       Past Medical History:   Diagnosis Date    Asthma     Back pain     Migraine        Past Surgical History:   Procedure Laterality Date     SECTION         History reviewed  No pertinent family history  I have reviewed and agree with the history as documented  Social History   Substance Use Topics    Smoking status: Former Smoker    Smokeless tobacco: Never Used    Alcohol use No        Review of Systems   All other systems reviewed and are negative  Physical Exam  Physical Exam   Constitutional: She is oriented to person, place, and time  She appears well-developed and well-nourished  No distress  HENT:   Head: Normocephalic and atraumatic     Eyes: Conjunctivae are normal    EOM grossly intact Neck: Normal range of motion  Neck supple  No JVD present  Cardiovascular: Normal rate  Pulmonary/Chest: Effort normal    Abdominal: Soft  Musculoskeletal:        Arms:  FROM, steady gait, cap refill brisk, strength and sensation grossly intact throughout   Neurological: She is alert and oriented to person, place, and time  Skin: Skin is warm and dry  Capillary refill takes less than 2 seconds  Psychiatric: She has a normal mood and affect  Her behavior is normal    Nursing note and vitals reviewed  Vital Signs  ED Triage Vitals   Temperature Pulse Respirations Blood Pressure SpO2   09/13/18 1427 09/13/18 1428 09/13/18 1427 09/13/18 1428 09/13/18 1430   (!) 97 1 °F (36 2 °C) 69 18 168/82 97 %      Temp Source Heart Rate Source Patient Position - Orthostatic VS BP Location FiO2 (%)   09/13/18 1427 09/13/18 1427 09/13/18 1427 09/13/18 1427 --   Oral Monitor Sitting Left arm       Pain Score       --                  Vitals:    09/13/18 1427 09/13/18 1428   BP:  168/82   Pulse:  69   Patient Position - Orthostatic VS: Sitting        Visual Acuity      ED Medications  Medications - No data to display    Diagnostic Studies  Results Reviewed     None                 XR shoulder 2+ views RIGHT   Final Result by Dejan Leos MD (09/13 1531)      No acute osseous abnormality  Workstation performed: [id number]                    Procedures  Procedures       Phone Contacts  ED Phone Contact    ED Course                               MDM  Number of Diagnoses or Management Options  Shoulder pain, right:   Strain of neck muscle, initial encounter:   Diagnosis management comments: 51-year-old female presenting with right shoulder pain x1 week  Patient reports she lifts overhead heavy boxes at her job  X-ray negative for fracture of right shoulder  Will send home with anti-inflammatories, follow up with Orthopedics and PCP      All labs and imaging discussed with patient, strict return to ED precautions discussed  Pt verbalizes understanding and agrees with plan  Pt is stable for discharge    Portions of the record may have been created with voice recognition software  Occasional wrong word or "sound a like" substitutions may have occurred due to the inherent limitations of voice recognition software  Read the chart carefully and recognize, using context, where substitutions have occurred  CritCare Time    Disposition  Final diagnoses:   Shoulder pain, right   Strain of neck muscle, initial encounter     Time reflects when diagnosis was documented in both MDM as applicable and the Disposition within this note     Time User Action Codes Description Comment    9/13/2018  3:46 PM Sreekanth [address] [place] [address] Shoulder pain, right     9/13/2018  3:48 PM Catarino Rogers Add Martinus [place]  1XXA] Strain of neck muscle, initial encounter       ED Disposition     ED Disposition Condition Comment    Discharge  Charlie Orozco discharge to home/self care      Condition at discharge: Good        Follow-up Information     Follow up With Specialties Details Why [facility] Schedule an appointment as soon as possible for a visit If symptoms worsen [address] [place] [address]  [phone]  [address]  If symptoms worsen [facility] [id number] [phone]          Patient's Medications   Discharge Prescriptions    BACLOFEN 10 MG TABLET    Take 1 tablet (10 mg total) by mouth 3 (three) times a day       Start Date: 9/13/2018 End Date: --       Order Dose: 10 mg       Quantity: 15 tablet    Refills: 0    NAPROXEN (NAPROSYN) 375 MG TABLET    Take 1 tablet (375 mg total) by mouth 2 (two) times a day with meals       Start Date: 9/13/2018 End Date: --       Order Dose: 375 mg       Quantity: 20 tablet    Refills: 0     No discharge procedures on file     ED Provider  Electronically Signed by           Mary Jamison PA-C  09/13/18 5017

## 2018-09-13 NOTE — DISCHARGE INSTRUCTIONS
Dolor de hombro, cuidados ambulatorios   INFORMACIÓN GENERAL:   El dolor de hombro  es un problema común y puede afectar afia actividades diarias  El dolor puede ser causado por un problema dentro de monterroso hombro  El dolor de hombro también puede ser causado por el dolor que se propaga hacia monterroso hombro de otra parte de monterroso cuerpo  Busque atención médica inmediata al presentar los siguientes síntomas:   · Dolor intenso    · Incapacidad de  monterroso hombro o brazo    · Entumecimiento o cosquilleo en el hombro o en el brazo  El tratamiento para el dolor de hombro  puede incluir alguno de los siguientes:  · El acetaminofén  disminuye el dolor y la [place]  Está disponible sin receta médica  Pregunte la cantidad que debe romel y con qué frecuencia debe tomarlo  [address]  El acetaminofén puede causar daño al hígado si no se john correctamente  · Los antiiflamatorios no esteroideos (AINEs) ayudan a reducir inflamación y dolor o [place]  [place]han medicamento esta disponible con o sin viola receta médica  Los AINEs podrían causar sangrado estomacal o problemas en los riñones en sportif225  Si usted esta tomando un anticoágulante, siempre  pregunte a monterroso proveedor de darwin si los AINEs son seguros para usted  Antes de [employer], tawnya siempre la etiqueta de información y siga afia indicaciones  · Viola inyección de esteroides  puede ayudar a disminuir el dolor y la inflamación  · Puede ser necesaria la cirugía  para el dolor crónico y por la pérdida de función  Controle afia síntomas:   · Aplique hielo  sobre el hombro sun 20 a 30 minutos cada 2 horas o según las indicaciones  Use viola compresa de hielo o coloque hielo brittny en viola bolsa plástica y cúbrala con viola toalla  El hielo ayuda a prevenir el daño a los tejidos y disminuye la inflamación y el dolor  · Aplicar calor si el hielo no ayuda a afia síntomas    Aplique calor sobre el hombro sun 20 a 30 minutos cada 2 horas sun los días indicados  El calor ayuda a disminuir el dolor y los espasmos musculares  · Acuda a rehabilitación o terapia ocupacional eddi se le indique  Un fisioterapeuta le enseñará ejercicios para ayudar a mejorar el rango de movimiento y la fuerza con el fin de disminuir el dolor  Un terapeuta ocupacional le enseñará habilidades para ayudar con afia actividades diarias  Prevenir el dolor de hombro:   · Estirar y fortalecer trimble hombro  Use la técnica apropiada sun los ejercicios y deportes  · Limite las [address] eddi se le indique  Trate de evitar los movimientos repetitivos por encima de la aubrey  Erroll Ravens a trimble consulta de control con trimble proveedor de darwin o con el ortopedista eddi se le indique:  Anote afia preguntas para que recuerde hacerlas sun afia citas médicas  ACUERDOS SOBRE TRIMBLE CUIDADO:   Usted tiene el derecho de participar en la planificación de trimble cuidado  Aprenda todo lo que pueda sobre trimble condición y eddi darle tratamiento  Discuta con afia médicos afia opciones de tratamiento para juntos decidir el cuidado que usted quiere recibir  Usted siempre tiene el derecho a rechazar trimble tratamiento  Esta información es sólo para uso en educación  Trimble intención no es darle un consejo médico sobre enfermedades o tratamientos  Colsulte con trimble Faheem Polanco farmacéutico antes de seguir cualquier régimen médico para saber si es seguro y efectivo para usted  © 2014 [address] is for End User's use only and may not be sold, redistributed or otherwise used for commercial purposes  All illustrations and images included in CareNotes® are the copyrighted property of A D A M , Inc  or Francois Hensley

## 2018-09-21 ENCOUNTER — APPOINTMENT (EMERGENCY)
Dept: RADIOLOGY | Facility: HOSPITAL | Age: 58
End: 2018-09-21
Payer: COMMERCIAL

## 2018-09-21 ENCOUNTER — HOSPITAL ENCOUNTER (EMERGENCY)
Facility: HOSPITAL | Age: 58
Discharge: HOME | End: 2018-09-21
Attending: EMERGENCY MEDICINE | Admitting: EMERGENCY MEDICINE
Payer: COMMERCIAL

## 2018-09-21 VITALS
WEIGHT: 196.3 LBS | TEMPERATURE: 98.2 F | DIASTOLIC BLOOD PRESSURE: 77 MMHG | SYSTOLIC BLOOD PRESSURE: 115 MMHG | RESPIRATION RATE: 18 BRPM | OXYGEN SATURATION: 95 % | HEART RATE: 93 BPM

## 2018-09-21 DIAGNOSIS — J45.901 ASTHMA EXACERBATION: Primary | ICD-10-CM

## 2018-09-21 LAB
ATRIAL RATE: 76 BPM
P AXIS: 57 DEGREES
PR INTERVAL: 170 MS
QRS AXIS: 68 DEGREES
QRSD INTERVAL: 74 MS
QT INTERVAL: 362 MS
QTC INTERVAL: 407 MS
T WAVE AXIS: 31 DEGREES
VENTRICULAR RATE: 76 BPM

## 2018-09-21 PROCEDURE — 93010 ELECTROCARDIOGRAM REPORT: CPT | Performed by: INTERNAL MEDICINE

## 2018-09-21 PROCEDURE — 71046 X-RAY EXAM CHEST 2 VIEWS: CPT

## 2018-09-21 PROCEDURE — 94640 AIRWAY INHALATION TREATMENT: CPT

## 2018-09-21 PROCEDURE — 99284 EMERGENCY DEPT VISIT MOD MDM: CPT

## 2018-09-21 PROCEDURE — 93005 ELECTROCARDIOGRAM TRACING: CPT

## 2018-09-21 RX ORDER — PREDNISONE 20 MG/1
60 TABLET ORAL ONCE
Status: COMPLETED | OUTPATIENT
Start: 2018-09-21 | End: 2018-09-21

## 2018-09-21 RX ORDER — ALBUTEROL SULFATE 2.5 MG/3ML
5 SOLUTION RESPIRATORY (INHALATION) ONCE
Status: COMPLETED | OUTPATIENT
Start: 2018-09-21 | End: 2018-09-21

## 2018-09-21 RX ORDER — METHYLPREDNISOLONE SODIUM SUCCINATE 125 MG/2ML
125 INJECTION, POWDER, LYOPHILIZED, FOR SOLUTION INTRAMUSCULAR; INTRAVENOUS ONCE
Status: DISCONTINUED | OUTPATIENT
Start: 2018-09-21 | End: 2018-09-21

## 2018-09-21 RX ORDER — PREDNISONE 20 MG/1
40 TABLET ORAL DAILY
Qty: 10 TABLET | Refills: 0 | Status: SHIPPED | OUTPATIENT
Start: 2018-09-21 | End: 2018-09-26

## 2018-09-21 RX ADMIN — Medication 0.5 MG: at 18:07

## 2018-09-21 RX ADMIN — IPRATROPIUM BROMIDE 0.5 MG: 0.5 SOLUTION RESPIRATORY (INHALATION) at 18:07

## 2018-09-21 RX ADMIN — PREDNISONE 60 MG: 20 TABLET ORAL at 19:44

## 2018-09-21 RX ADMIN — ALBUTEROL SULFATE 5 MG: 2.5 SOLUTION RESPIRATORY (INHALATION) at 19:45

## 2018-09-21 RX ADMIN — IPRATROPIUM BROMIDE 0.5 MG: 0.5 SOLUTION RESPIRATORY (INHALATION) at 19:44

## 2018-09-21 RX ADMIN — ALBUTEROL SULFATE 5 MG: 2.5 SOLUTION RESPIRATORY (INHALATION) at 18:07

## 2018-09-21 NOTE — ED PROVIDER NOTES
History  Chief Complaint   Patient presents with    Cough     Productive cough starting yesterday  Also reporting chest pain and SOB  Hx asthma, used inhaler yesterday  This is a 59-year-old primarily Icelandic-speaking patient with a past medical history of asthma who presents to the emergency department this evening with a productive cough for the past three days  Patient also having some chest pain whenever she coughs and no chest pain when she does not cough  Patient has been using her albuterol inhaler and albuterol nebulizer at home with little relief  She is coming in today because the symptoms have gotten worse today compared to the previous two days  Patient denies any tobacco, alcohol, or drug use she does not take any other medications  Patient denies any fevers, chills, vomiting, abdominal pain, diarrhea, constipation  Patient does note some nausea, however  Prior to Admission Medications   Prescriptions Last Dose Informant Patient Reported? Taking? albuterol (ACCUNEB) 0 63 MG/3ML nebulizer solution   Yes Yes   Sig: Take 1 ampule by nebulization every 6 (six) hours as needed for wheezing      Facility-Administered Medications: None       Past Medical History:   Diagnosis Date    Asthma     Back pain     Migraine        Past Surgical History:   Procedure Laterality Date     SECTION         History reviewed  No pertinent family history  I have reviewed and agree with the history as documented  Social History   Substance Use Topics    Smoking status: Former Smoker    Smokeless tobacco: Never Used    Alcohol use No        Review of Systems   Constitutional: Negative for chills, fatigue and fever  HENT: Negative for congestion, rhinorrhea, sinus pressure and sore throat  Eyes: Negative for visual disturbance  Respiratory: Positive for cough  Negative for shortness of breath  Cardiovascular: Positive for chest pain     Gastrointestinal: Negative for abdominal pain, constipation, diarrhea, nausea and vomiting  Genitourinary: Negative for dysuria, frequency, hematuria and urgency  Musculoskeletal: Negative for arthralgias and myalgias  Skin: Negative for color change and rash  Neurological: Negative for dizziness, light-headedness and numbness  Physical Exam  ED Triage Vitals [09/21/18 1736]   Temperature Pulse Respirations Blood Pressure SpO2   98 2 °F (36 8 °C) 92 22 150/87 97 %      Temp Source Heart Rate Source Patient Position - Orthostatic VS BP Location FiO2 (%)   Temporal Monitor Sitting Right arm --      Pain Score       --           Orthostatic Vital Signs  Vitals:    09/21/18 1736 09/21/18 1853 09/21/18 2042   BP: 150/87 120/75 115/77   Pulse: 92 95 93   Patient Position - Orthostatic VS: Sitting  Sitting       Physical Exam   Constitutional: She is oriented to person, place, and time  She appears well-developed and well-nourished  No distress  HENT:   Head: Normocephalic and atraumatic  Eyes: Conjunctivae and EOM are normal  Pupils are equal, round, and reactive to light  Right eye exhibits no discharge  Left eye exhibits no discharge  No scleral icterus  Neck: Normal range of motion  Neck supple  No JVD present  Cardiovascular: Normal rate, regular rhythm and normal heart sounds  Exam reveals no gallop and no friction rub  No murmur heard  Pulmonary/Chest: Effort normal  No stridor  No respiratory distress  She has wheezes (Expiratory wheezes in all lung fields)  She has no rales  Abdominal: Soft  Bowel sounds are normal  She exhibits no distension  There is no tenderness  There is no guarding  Musculoskeletal: Normal range of motion  She exhibits no edema, tenderness or deformity  Neurological: She is alert and oriented to person, place, and time  No cranial nerve deficit or sensory deficit  She exhibits normal muscle tone  Skin: Skin is warm and dry  No rash noted  She is not diaphoretic  No erythema  No pallor  Psychiatric: She has a normal mood and affect  Her behavior is normal    Nursing note and vitals reviewed  ED Medications  Medications   albuterol inhalation solution 5 mg (5 mg Nebulization Given 9/21/18 1807)   ipratropium (ATROVENT) 0 02 % inhalation solution 0 5 mg (0 5 mg Nebulization Given 9/21/18 1807)   albuterol inhalation solution 5 mg (5 mg Nebulization Given 9/21/18 1945)   ipratropium (ATROVENT) 0 02 % inhalation solution 0 5 mg (0 5 mg Nebulization Given 9/21/18 1944)   predniSONE tablet 60 mg (60 mg Oral Given 9/21/18 1944)       Diagnostic Studies  Results Reviewed     None                 XR chest 2 views   ED Interpretation by Jacqueline Gillette MD (09/21 2009)   No acute cardiopulmonary process identified  Final Result by Doug Lockett MD (09/22 0820)      No acute cardiopulmonary disease  Workstation performed: [id number]               Procedures  Procedures      Phone Consults  ED Phone Contact    ED Course                               MDM  Number of Diagnoses or Management Options  Asthma exacerbation:   Diagnosis management comments: Patient's symptoms improved after the second DuoNeb  Her chest x-ray was unremarkable and she was discharged home in good condition with instructions to follow up with the info link number in order to get a primary care physician to follow up with if symptoms continue or return to the emergency department should she develop any new or concerning symptoms  Patient is discharged home with a prescription for prednisone      CritCare Time    Disposition  Final diagnoses:   Asthma exacerbation     Time reflects when diagnosis was documented in both MDM as applicable and the Disposition within this note     Time User Action Codes Description Comment    9/21/2018  8:29 PM Rick Alba Add [I79 [id number]] Asthma exacerbation       ED Disposition     ED Disposition Condition Comment    Discharge  Gracia Roldan discharge to home/self care     Condition at discharge: Good        Follow-up Information     Follow up With Specialties Details Why Contact Info Additional Information    [facility]fol[facility]    [facility] Emergency [facility]  If symptoms worsen [address] [facility]  [phone] [address] [place] [address] ED, [address] [place], [place], [address]          Discharge Medication List as of 9/21/2018  8:38 PM      START taking these medications    Details   predniSONE 20 mg tablet Take 2 tablets (40 mg total) by mouth daily for 5 days, Starting Fri 9/21/2018, Until Wed 9/26/2018, Print         CONTINUE these medications which have NOT CHANGED    Details   albuterol (ACCUNEB) 0 63 MG/3ML nebulizer solution Take 1 ampule by nebulization every 6 (six) hours as needed for wheezing, Historical Med           No discharge procedures on file  ED Provider  Attending physically available and evaluated Gerardo Isabel  I managed the patient along with the ED Attending      Electronically Signed by         Tawanna Singleton MD  09/23/18 2024

## 2018-09-22 NOTE — DISCHARGE INSTRUCTIONS
Asma   LO QUE NECESITA SABER:   ¿Qué es el asma? El asma es edil enfermedad pulmonar que dificulta la respiración  La inflamación crónica y las reacciones a los desencadenantes estrechan las vías respiratorias en los pulmones  El asma puede ser de peligro mortal si no se mantiene bajo control  ¿Qué es el asma variante con tos? El asma con tos variante es un tipo de asma que causa edil tos seca que reaparece continuamente  La tos seca podría ser monterroso único síntoma o es posible que también sienta opresión en el pecho  Estos síntomas podrían ser provocados por el ejercicio o por la exposición a olores, alérgenos o infecciones del tracto respiratorio  El asma con tos variante se trata de la misma manera que el asma típico    ¿Cuáles son los signos o síntomas del asma? · Toser     · Sibilancias     · Dificultad para respirar     · Opresión en el pecho  ¿Qué podría desencadenar un ataque de asma? · Un resfriado, la gripe o infección de sinusitis     · El ejercicio     · Cambios del clima, especialmente el aire frío y seco    · Ser fumador o el humo de segunda mano    · El humo de químicos, polvo o contaminación del aire u otras partículas diminutas en el aire    · [place], polen, ácaros del polvo o cucarachas       ¿Cómo se diagnostica el asma? Monterroso médico lo examinará y escuchará afia pulmones  Le preguntará con qué frecuencia se presentan los síntomas y qué cosas hacen que empeoren  Coméntele si tiene problemas para conciliar el sueño, hacer ejercicio o realizar otras actividades debido a la falta de [place]  Monterroso médico le preguntará sobre afia alergias y resfriados previos, y si algún familiar tiene alergias o asma  Infórmele sobre [place], incluyendo los medicamentos sin receta y los suplementos herbales  Es posible que usted deba realizarse edil radiografía de pecho para detectar problemas en los pulmones o un examen de función pulmonar   En los exámenes de función pulmonar se conoce qué tan edward puede respirar  ¿Cuál es el tratamiento para el asma? · Medicamentos,  disminuyen la inflamación, abren las vías respiratorias y facilitan monterroso respiración  Los medicamentos se pueden inhalar, romel en forma de píldora o ser inyectados  Los medicamentos a corto plazo alivian afia síntomas con [place]  Los medicamentos a branden plazo sirven para evitar ataques en un futuro  Es posible que además necesite medicamento para ayudar a controlar las alergias  · Los exámenes de alergias  podrían encontrar las alergias que desencadenan un ataque de asma  Es posible que necesite recibir inyecciones o medicamentos para controlar las alergias que hacen que monterroso asma empeore  ¿Cómo puedo controlar mis síntomas y evitar ataques futuros? · Siga monterroso plan de acción para el asma  [place] es un plan por escrito que usted y monterroso médico bowles creado  Explica cuáles medicamentos necesita usted y cuándo cambiar las dosis si fuera necesario  Además explica eddi usted puede monitorear afia síntomas y usar un medidor del flujo makenzie  El medidor mide qué tan edward están funcionando los pulmones  · Controle otras afecciones de [place] , eddi las [place], el reflujo estomacal y la apnea de sueño  · Identifique y evite factores desencadenantes  Estos podrían [employer], los ácaros del [place], el moho y las cucarachas  · No fume o esté alrededor de personas que fuman  La nicotina y otras sustancias químicas que contienen los cigarrillos y cigarros pueden dañar los pulmones  Pida información a monterroso médico si usted actualmente fuma y necesita ayuda para dejar de fumar  Los cigarrillos electrónicos o tabaco sin humo todavía contienen nicotina  Consulte con monterroso médico antes de [employer]  · Pregunte sobre la vacuna contra la gripe  La gripe puede empeorar monterroso asma  Puede que usted necesite recibir edil vacuna anual contra la gripe  ¿Cuándo michael buscar atención inmediata? · Usted tiene falta de aliento severa  · Pamela labios y [place] se ponen azules o grises  · La piel alrededor de trimble aminata y costillas se hunde con cada respiración  · Usted tiene falta de [place], incluso después de romel trimble medicamento a corto plazo según lo indicado  · Las lecturas numéricas del medidor de trimble flujo makenzie están en la rickie sanjay de trimble plan de acción para el asma  ¿Cuándo michael comunicarme con mi médico?   · A usted se le acaba el medicamento antes de la fecha de trimble próximo abastecimiento  · Pamela síntomas empeoran  · Usted necesita romel más medicamento que lo acostumbrado para controlar pamela síntomas  · Usted tiene preguntas o inquietudes acerca de trimble condición o cuidado  ACUERDOS SOBRE TRIMBLE CUIDADO:   Usted tiene el derecho de ayudar a planear trimble cuidado  Aprenda todo lo que pueda sobre trimble condición y eddi darle tratamiento  Discuta pamela opciones de tratamiento con pamela médicos para decidir el cuidado que usted desea recibir  Usted siempre tiene el derecho de rechazar el tratamiento  Esta información es sólo para uso en educación  Trimble intención no es darle un consejo médico sobre enfermedades o tratamientos  Colsulte con trimble Ladean Artist farmacéutico antes de seguir cualquier régimen médico para saber si es seguro y efectivo para usted  © 2017 [address] Information is for End User's use only and may not be sold, redistributed or otherwise used for commercial purposes  All illustrations and images included in CareNotes® are the copyrighted property of A D A M , Inc  or Francois Hensley

## 2018-09-22 NOTE — ED ATTENDING ATTESTATION
Carlos Lee MD, saw and evaluated the patient  I have discussed the patient with the resident/non-physician practitioner and agree with the resident's/non-physician practitioner's findings, Plan of Care, and MDM as documented in the resident's/non-physician practitioner's note, except where noted  All available labs and Radiology studies were reviewed  At this point I agree with the current assessment done in the Emergency Department  I have conducted an independent evaluation of this patient a history and physical is as follows:    80-year-old female presents for evaluation of cough, wheezing, shortness of breath, chest pain that is only present with coughing that started yesterday  Symptoms improved with use of inhaler at home  Symptoms not completely resolved  Feels similar to prior asthma exacerbations  No risk for DVT or pulmonary embolism  Ten systems reviewed otherwise negative  On exam acute distress, lungs with tenderness palpation of reproduces chest pain, diffuse wheezing noted, cardiac normal, abdomen normal  Medical decision making;-reproducible chest pain in the setting of wheezing and cough this difficult asthma-will do chest x-ray rule out pneumonia/pneumothorax, EKG to rule out cardiac pathology, treat for musculoskeletal chest pain  Cough and wheezing-will treat with steroids, nebs, x-ray rule out pneumonia    Reassess for disposition    Critical Care Time  CritCare Time    Procedures

## 2019-01-06 ENCOUNTER — APPOINTMENT (EMERGENCY)
Dept: RADIOLOGY | Facility: HOSPITAL | Age: 59
End: 2019-01-06
Payer: COMMERCIAL

## 2019-01-06 ENCOUNTER — HOSPITAL ENCOUNTER (EMERGENCY)
Facility: HOSPITAL | Age: 59
Discharge: HOME | End: 2019-01-06
Attending: EMERGENCY MEDICINE | Admitting: EMERGENCY MEDICINE
Payer: COMMERCIAL

## 2019-01-06 VITALS
TEMPERATURE: 97.4 F | HEART RATE: 67 BPM | SYSTOLIC BLOOD PRESSURE: 136 MMHG | DIASTOLIC BLOOD PRESSURE: 62 MMHG | RESPIRATION RATE: 16 BRPM | OXYGEN SATURATION: 99 %

## 2019-01-06 DIAGNOSIS — M54.16 LUMBAR RADICULOPATHY: Primary | ICD-10-CM

## 2019-01-06 PROCEDURE — 99284 EMERGENCY DEPT VISIT MOD MDM: CPT

## 2019-01-06 PROCEDURE — 72100 X-RAY EXAM L-S SPINE 2/3 VWS: CPT

## 2019-01-06 RX ORDER — DIAZEPAM 5 MG/1
5 TABLET ORAL EVERY 8 HOURS PRN
Qty: 15 TABLET | Refills: 0 | Status: SHIPPED | OUTPATIENT
Start: 2019-01-06 | End: 2019-01-11

## 2019-01-06 RX ORDER — NAPROXEN 500 MG/1
500 TABLET ORAL 2 TIMES DAILY WITH MEALS
Qty: 20 TABLET | Refills: 0 | Status: SHIPPED | OUTPATIENT
Start: 2019-01-06 | End: 2019-01-16

## 2019-01-06 NOTE — ED PROVIDER NOTES
History  Chief Complaint   Patient presents with    Numbness     Patient reporting lower back pain with pain radiating into the L leg  Patient also states she has numbness in the L leg  Denies bowel/bladder symptoms   Back Pain       History provided by:  Patient  Back Pain   Location:  Lumbar spine  Radiates to:  L posterior upper leg and L thigh  Pain severity:  Moderate  Onset quality:  Sudden  Timing:  Constant  Progression:  Worsening  Context: lifting heavy objects, occupational injury and twisting    Context: not emotional stress, not falling, not jumping from heights, not MCA, not MVA, not pedestrian accident, not physical stress, not recent illness and not recent injury    Relieved by:  None tried  Worsened by:  Nothing  Ineffective treatments:  None tried  Associated symptoms: no abdominal pain, no abdominal swelling, no bladder incontinence, no bowel incontinence, no chest pain, no dysuria, no fever, no headaches, no leg pain, no numbness, no paresthesias, no pelvic pain, no perianal numbness, no tingling, no weakness and no weight loss        Prior to Admission Medications   Prescriptions Last Dose Informant Patient Reported? Taking? albuterol (ACCUNEB) 0 63 MG/3ML nebulizer solution   Yes No   Sig: Take 1 ampule by nebulization every 6 (six) hours as needed for wheezing      Facility-Administered Medications: None       Past Medical History:   Diagnosis Date    Asthma     Back pain     Migraine        Past Surgical History:   Procedure Laterality Date     SECTION         History reviewed  No pertinent family history  I have reviewed and agree with the history as documented  Social History   Substance Use Topics    Smoking status: Former Smoker    Smokeless tobacco: Never Used    Alcohol use No        Review of Systems   Constitutional: Negative for activity change, appetite change, fatigue, fever and weight loss     HENT: Negative for nosebleeds, sneezing, sore throat, trouble swallowing and voice change  Eyes: Negative for photophobia, pain and visual disturbance  Respiratory: Negative for apnea, choking and stridor  Cardiovascular: Negative for chest pain, palpitations and leg swelling  Gastrointestinal: Negative for abdominal pain, anal bleeding, bowel incontinence and constipation  Endocrine: Negative for cold intolerance, heat intolerance, polydipsia and polyphagia  Genitourinary: Negative for bladder incontinence, decreased urine volume, dysuria, enuresis, frequency, genital sores, pelvic pain and urgency  Musculoskeletal: Positive for back pain  Negative for joint swelling and myalgias  Allergic/Immunologic: Negative for environmental allergies and food allergies  Neurological: Negative for tingling, tremors, seizures, speech difficulty, weakness, numbness, headaches and paresthesias  Hematological: Negative for adenopathy  Psychiatric/Behavioral: Negative for behavioral problems, decreased concentration, dysphoric mood and hallucinations  Physical Exam  Physical Exam   Constitutional: She is oriented to person, place, and time  She appears well-developed and well-nourished  No distress  HENT:   Head: Normocephalic and atraumatic  Eyes: Pupils are equal, round, and reactive to light  Conjunctivae and EOM are normal    Neck: Normal range of motion  Neck supple  No tracheal deviation present  No thyromegaly present  Cardiovascular: Normal rate, regular rhythm, normal heart sounds and intact distal pulses  No murmur heard  Pulmonary/Chest: Effort normal and breath sounds normal  No respiratory distress  She has no wheezes  She has no rales  Abdominal: Soft  Bowel sounds are normal  She exhibits no distension  There is no tenderness  Musculoskeletal: She exhibits tenderness  Right shoulder: She exhibits tenderness, bony tenderness, deformity, pain and spasm   She exhibits normal range of motion, no swelling, no effusion, no crepitus, no laceration and normal strength  Lumbar back: She exhibits decreased range of motion, tenderness, pain and spasm  She exhibits no swelling and no deformity  Neurological: She is alert and oriented to person, place, and time  She has normal reflexes  No cranial nerve deficit  Skin: She is not diaphoretic  Psychiatric: She has a normal mood and affect  Her behavior is normal        Vital Signs  ED Triage Vitals [01/06/19 1113]   Temperature Pulse Respirations Blood Pressure SpO2   (!) 97 4 °F (36 3 °C) 67 16 136/62 99 %      Temp Source Heart Rate Source Patient Position - Orthostatic VS BP Location FiO2 (%)   Oral Monitor Sitting Right arm --      Pain Score       8           Vitals:    01/06/19 1113   BP: 136/62   Pulse: 67   Patient Position - Orthostatic VS: Sitting       Visual Acuity  Visual Acuity      Most Recent Value   L Pupil Size (mm)  3   R Pupil Size (mm)  3          ED Medications  Medications - No data to display    Diagnostic Studies  Results Reviewed     None                 XR spine lumbar 2 or 3 views injury   ED Interpretation by Abigail Shipley PA-C (01/06 1156)   No acute abnormalities                 Procedures  Procedures       Phone Contacts  ED Phone Contact    ED Course                               MDM  CritCare Time    Disposition  Final diagnoses:   Lumbar radiculopathy     Time reflects when diagnosis was documented in both MDM as applicable and the Disposition within this note     Time User Action Codes Description Comment    1/6/2019 11:56 AM Leola Tucker Add [U49 [id number]] Lumbar radiculopathy       ED Disposition     ED Disposition Condition Comment    Discharge  Jacquelyn Quintana discharge to home/self care      Condition at discharge: Stable        Follow-up Information     Follow up With Specialties Details Why Contact Info Additional Information    [facility]  Schedule an appointment as soon as possible for a visit  [facility] [place]  [phone] [facility], [address], [place], [address] [facility], [address]          Patient's Medications   Discharge Prescriptions    DIAZEPAM (VALIUM) 5 MG TABLET    Take 1 tablet (5 mg total) by mouth every 8 (eight) hours as needed for anxiety for up to 5 days       Start Date: 1/6/2019  End Date: 1/11/2019       Order Dose: 5 mg       Quantity: 15 tablet    Refills: 0    NAPROXEN (EC NAPROSYN) 500 MG EC TABLET    Take 1 tablet (500 mg total) by mouth 2 (two) times a day with meals for 10 days       Start Date: 1/6/2019  End Date: 1/16/2019       Order Dose: 500 mg       Quantity: 20 tablet    Refills: 0     No discharge procedures on file      ED Provider  Electronically Signed by           Naima Guidry PA-C  01/06/19 4320

## 2019-01-06 NOTE — DISCHARGE INSTRUCTIONS
Radiculopatía lumbar   LO QUE NECESITA SABER:   La radiculopatía lumbar es edil enfermedad dolorosa que sucede cuando un nervio de monterroso quinten lumbar (parte baja de la espalda) se pinza o se irrita  Los nervios controlan las sensaciones y movimientos de monterroso cuerpo  Usted podría tener adormecimiento o dolor desde la parte inferior de la espalda hacia afia pies  INSTRUCCIONES SOBRE EL ANTELMO HOSPITALARIA:   Medicamentos:   · Medicamentos:     ¨ AINEs (Analgésicos antiinflamatorios no esteroides) eddi el ibuprofeno, ayudan a disminuir la inflamación, el dolor y la fiebre  Shiva medicamento esta disponible con o sin edil receta médica  Los AINEs pueden causar sangrado estomacal o problemas renales en ciertas personas  Si usted john un medicamento anticoagulante, siempre pregúntele a monterroso médico si los YONATHAN son seguros para usted  Siempre tawnya la etiqueta de shiva medicamento y [place] instrucciones  ¨ Relajantes musculares  ayudan a reducir dolor y espasmos musculares  ¨ Opioides: Estos son medicamentos muy awa que se administran para reducir el dolor severo  También se les conoce eddi medicamentos narcóticos para el dolor  [place] el medicamento exactamente eddi le indicó monterroso médico     ¨ Esteroides orales: Los esteroides se administran para reducir la inflamación y el dolor  ¨ [place] afia medicamentos eddi se le haya indicado  Consulte con monterroso médico si usted doyle que monterroso medicamento no le está ayudando o si presenta efectos secundarios  Infórmele si es alérgico a algún medicamento  Mantenga edil lista actualizada de los [place], las vitaminas y los productos herbales que john  Incluya los siguientes datos de los medicamentos: cantidad, frecuencia y motivo de administración  Traiga con usted la lista o los envases de la píldoras a afia citas de seguimiento  Lleve la lista de los medicamentos con usted en barney de edil emergencia      Programe edil kathi con monterroso médico o especialista en [place] dentro de 1 a 3 semanas:  Después de monterroso primera kathi de seguimiento, vuelva a monterroso médico o especialista en [place] cada 2 semanas hasta que se haya curado  Solicitar información acerca de fisioterapia para monterroso problema  Anote afia preguntas para que se acuerde de hacerlas sun afia visitas  Fisioterapia:  Usted podría necesitar fisioterapia para ayudar a mejorar monterroso condición  Monterroso fisioterapeuta le podría enseñar ejercicios para ayudar a mejorar monterroso postura (la forma que usted se para y se sienta), flexibilidad, y fuerza en la parte inferior de monterroso espalda  Cuidado personal:   · Manténgase activo: Es mejor ser activo cuando se padece de radiculopatía lumbar  Monterroso fisioterapeuta o médico probablemente le recomendarán que salga a caminar para poco a poco regresar a monterroso rutina diaria  Evite guardar cama por largos periodos de [place]  Guardar cama podría empeorar aifa síntomas  No se mueva en formas que empeoran monterroso dolor  Pregúntele a monterroso médico por más información sobre la mejor forma de permanecer activo  · Uso de compresas frías o calientes:  Use compresas frías o calientes en el área adolorida para disminuir el dolor y la inflamación  Ponga hielo en edil bolsa plástica y cúbrala con edil toalla y colóquela sobre la parte de abajo de monterroso espalda  [place] las compresas calientes con edil toalla para evitar quemaduras  Use el hielo eddi se lo indican  · Evite levantar objetos pesados: Monterroso condición podría empeorar si usted levanta cosas pesadas  Si es posible evite alzar del todo  · Mantenga un peso saludable:  El peso en exceso podría torcer monterroso espalda  Hable con monterroso médico sobre formas de adelgazar si usted sufre de [place]  Comuníquese con monterroso médico o especialista en colum[place] si:   · El dolor que siente no mejora dentro de 1 a 3 semanas después del [place]  · El dolor y la debilidad que siente evitan que usted lleve a cabo afia actividades normales en el [place], casa o escuela      · Usted baja más de 10 libras en 6 meses sin proponérselo  · Usted está deprimido o sunny debido al dolor que siente  · Usted tiene preguntas o inquietudes acerca de jason condición o cuidado  Regrese a la aiyana de emergencias si:   · Tiene temperatura de más de 100 4°F por más de 2 días  · Tiene un dolor de espalda o pierna severo que no sentía antes, o el dolor se propaga a ambas piernas  · Presenta señales nuevas de adormecimiento o debilidad, sobre todo en la parte de abajo de la espalda, piernas, brazos o genitales  · Tiene problemas nuevos para controlar la [place] y heces  · Usted siente eddi si jason vejiga no se vaciara cuando orina  © 2017 [address] [employer] [employer] Information is for End User's use only and may not be sold, redistributed or otherwise used for commercial purposes  All illustrations and images included in CareNotes® are the copyrighted property of A D A M , Inc  or Francois Hensley  Esta información es sólo para uso en educación  Jason intención no es darle un consejo médico sobre enfermedades o tratamientos  Colsulte con jason Bhaskar Cool farmacéutico antes de seguir cualquier régimen médico para saber si es seguro y efectivo para usted